# Patient Record
Sex: FEMALE | Employment: UNEMPLOYED | URBAN - METROPOLITAN AREA
[De-identification: names, ages, dates, MRNs, and addresses within clinical notes are randomized per-mention and may not be internally consistent; named-entity substitution may affect disease eponyms.]

---

## 2017-01-25 ENCOUNTER — TELEPHONE (OUTPATIENT)
Dept: OBGYN | Facility: CLINIC | Age: 52
End: 2017-01-25

## 2017-01-25 NOTE — TELEPHONE ENCOUNTER
----- Message from Gail Yates sent at 1/25/2017  1:39 PM CST -----  Regarding: discuss breast pain  Contact: [phone]  Pt experiencing breast pain and would like a call back from Dr Zapien at number listed above.    Thanks    Gail PUENTE    Please DO NOT send this message and/or reply back to sender.  Call Center Representatives DO NOT respond to messages.

## 2017-01-25 NOTE — TELEPHONE ENCOUNTER
Spoke with Isha who stated that she has had breast pain since her last mammogram - reported to be March 2016 at [facility] (reports results normal). The pain is getting increasingly worse and is burning in nature. When asked about lump she reports she has dense breasts. Denies nipple leaking, dimpling, redness.    Advised she come in to clinic for evaluation and she agreed with plan. Was scheduled with nurse practitioner.

## 2017-01-27 ENCOUNTER — OFFICE VISIT (OUTPATIENT)
Dept: OBGYN | Facility: CLINIC | Age: 52
End: 2017-01-27
Attending: NURSE PRACTITIONER
Payer: COMMERCIAL

## 2017-01-27 DIAGNOSIS — N64.4 MASTODYNIA: ICD-10-CM

## 2017-01-27 DIAGNOSIS — N63.0 LUMP OR MASS IN BREAST: Primary | ICD-10-CM

## 2017-01-27 PROCEDURE — 99212 OFFICE O/P EST SF 10 MIN: CPT | Mod: ZF

## 2017-01-27 NOTE — NURSING NOTE
Chief Complaint   Patient presents with     RECHECK     C/O burning pain in left breast   Blanca Brito LPN

## 2017-01-27 NOTE — PROGRESS NOTES
Progress Note    SUBJECTIVE:  Isha Vergara is an 51 year old , who requests evaluation of breast pain.    Concerns today include:   1) Mastodynia: Isha has noted breast pain that started right after her last mammogram, in 2016.  She describes the pain as achey and that her breast is very tender to the touch.  Pain has slightly improved since first noted in March.  Denies any change in skin, dimpling, or nipple discharge.  She has a known small, mobile, cyst in the left breast at 6 o'clock position that has been evaluated by ultrasound ~7 yrs ago.  She states that it has not been evident on the annual mammograms she has had and has not changed over time, but is tender to palpation.  Pain did not change with menstrual cycles (see menstrual hx below).      2) Bilateral breast burning: Over the last several months, Isha has noted breast burning.  It started in the left breast, but now she experiences it in both breast.  The burning seems to start in the middle of her chest and radiate into each breast.  She feels this intermittently, but at least once per day.  Has found it uncomfortable to wear a bra; improvement was felt when not wearing a bra.  She works out 5-6 times per week.  Does not drink caffeine or drink alcohol due to her allergy to sulfites.  Denies chest pain, heaviness, or pressure.    Isha had a left breast cyst removed at age 17. She denies any family hx of breast, colon, or ovarian cancers.      Last mammogram: 2016 at [facility]; results were normal    Menstrual History:  - Last gynecologic visit note indicated that Isha has gone through menopause.  Upon assessing her menopausal state, Isha states that she has never gone 12 months without a menstrual period.  Isha states with confidence that she had one instance of going 8 months without a period, however, her menses returned July to 2016, with menses once per month, with normal flow for her.  She denies any vaginal  bleeding since 2016.    - She experiences ocassional mild hot flashes    HISTORY:  Prescription Medications as of 2017             Ascorbic Acid (VITAMIN C PO) Take 250 mg by mouth    Omega-3 Fatty Acids (OMEGA-3 FISH OIL PO) Take 1 g by mouth    calcium carbonate (OS-KRISTEL 500 MG [place]. CA) 500 MG tablet Take 500 mg by mouth 2 times daily    multivitamin, therapeutic with minerals (MULTI-VITAMIN) TABS Take 1 tablet by mouth daily        Allergies   Allergen Reactions     Sulfites Hives     Immunization History   Administered Date(s) Administered     Influenza Vaccine, 3 YRS +, IM (QUADRIVALENT W/PRESERVATIVES) 10/21/2016     Obstetric History       T2      TAB0   SAB0   E0   M0   L2      Past Medical History   Diagnosis Date     Acne      Eczema      Past Surgical History   Procedure Laterality Date      section       Excise breast cyst/fibroadenoma/tumor/duct lesion/nipple lesion/areolar lesion  age 17     Excise cyst thyroglossal duct       Family History   Problem Relation Age of Onset     Allergies Mother      Hypertension Father      C.A.D. Father      Thyroid Disease Mother      CEREBROVASCULAR DISEASE Maternal Grandmother      Social History     Social History     Marital Status:      Spouse Name: N/A     Number of Children: N/A     Years of Education: N/A     Social History Main Topics     Smoking status: Never Smoker      Smokeless tobacco: Never Used     Alcohol Use: No     Drug Use: No     Sexual Activity:     Partners: Male     Birth Control/ Protection: Other      Comment: Vasectomy     ROS   ROS: 10 point ROS neg other than the symptoms noted above in the HPI.    EXAM:  VS: 149/84; P: 108; Wt 130lb  General appearance: Pleasant female in no acute distress.     BREAST EXAM:  Breast: Without visible skin changes. No dimpling or lesions seen.   Breasts supple; no nipple discharge noted bilaterally; axillary nodes negative  Left breast: small 4mm-5mm round mobile  mass, tender to palpation; left breast diffusely tender to palpation  Right breast: without tenderness to palpation, nodularity, or a dominant mass    ASSESSMENT:  Encounter Diagnoses   Name Primary?     Lump or mass in breast Yes     Mastodynia      PLAN:   Orders Placed This Encounter   Procedures     US Breast Left Limited 1-3 Quadrants     BREAST CENTER REFERRAL   Bilateral Diagnostic Mammogram ordered.  Provided Isha with a patient handout on breast pain and discussed possible lifestyle modifications she could try to relieve her symptoms.  Encouraged Isha to keep a log of when she feels the pain and her diet and exercise to see if there are any identifiable triggers.    Isha is due for an annual exam 4/2017.  Isha left the clinic before the medical assistant was able to complete her follow-up vital signs.  Isha to follow-up on elevated blood pressure & pulse rate at her annual exam.  Verbalized understanding and agreement with visit plan.    20 minutes was spent in direct contact with the patient and > 50% of the time in patient education and coordination of care.  Chula Arriola, TIFFANIE, APRN, WHNP

## 2017-01-27 NOTE — MR AVS SNAPSHOT
After Visit Summary   1/27/2017    Isha Vergara    MRN: [id number]           Patient Information     Date Of Birth          1965        Visit Information        Provider Department      1/27/2017 12:45 PM Chula Arriola APRN CNP [facility]        Today's Diagnoses     Lump or mass in breast    -  1     Mastodynia            Follow-ups after your visit        Additional Services     BREAST CENTER REFERRAL       Your provider has referred you to: [facility]: Breast Center at [facility] [phone]   [email or web address]    Please be aware that coverage of these services is subject to the terms and limitations of your health insurance plan.  Call member services at your health plan with any benefit or coverage questions.      Please bring the following with you to your appointment:    (1) Any X-Rays, CTs or MRIs which have been performed.  Contact the facility where they were done to arrange for  prior to your scheduled appointment.    (2) List of current medications   (3) This referral request   (4) Any documents/labs given to you for this referral                  Future tests that were ordered for you today     Open Future Orders        Priority Expected Expires Ordered    Mammo diagnostic  digital (bilateral) Routine  1/27/2018 1/27/2017    US Breast Left Limited 1-3 Quadrants Routine  1/27/2018 1/27/2017            Who to contact     Please call your clinic at [phone] to:    Ask questions about your health    Make or cancel appointments    Discuss your medicines    Learn about your test results    Speak to your doctor   If you have compliments or concerns about an experience at your clinic, or if you wish to file a complaint, please contact [facility] Patient Relations at [phone] or email us at [email or web address]         Additional Information  About Your Visit        User Replay[employer] innovations Information     [employer] gives you secure access to your electronic health record. If you see a primary care provider, you can also send messages to your care team and make appointments. If you have questions, please call your primary care clinic.  If you do not have a primary care provider, please call [phone] and they will assist you.      [employer] is an electronic gateway that provides easy, online access to your medical records. With [employer], you can request a clinic appointment, read your test results, renew a prescription or communicate with your care team.     To access your existing account, please contact your [facility] or call [phone] for assistance.        Care EveryWhere ID     This is your Care EveryWhere ID. This could be used by other organizations to access your [facility] medical records  [id number]        Your Vitals Were     Last Period                   12/28/2014            Blood Pressure from Last 3 Encounters:   04/07/16 132/89   01/15/15 135/84   09/16/13 119/77    Weight from Last 3 Encounters:   04/07/16 58.968 kg (130 lb)   01/15/15 58.469 kg (128 lb 14.4 oz)   09/16/13 59.829 kg (131 lb 14.4 oz)              We Performed the Following     BREAST CENTER REFERRAL          Today's Medication Changes          These changes are accurate as of: 1/27/17  1:47 PM.  If you have any questions, ask your nurse or doctor.               Stop taking these medicines if you haven't already. Please contact your care team if you have questions.     ALLEGRA PO   Stopped by:  Chula Arriola APRN CNP           pimecrolimus 1 % cream   Commonly known as:  ELIDEL   Stopped by:  Chula Arriola APRN CNP           VITAMIN D (CHOLECALCIFEROL) PO   Stopped by:  Chula Arriola APRN CNP                    Primary Care Provider    None Specified       No primary provider on file.        Thank you!     Thank you for choosing  [facility]  for your care. Our goal is always to provide you with excellent care. Hearing back from our patients is one way we can continue to improve our services. Please take a few minutes to complete the written survey that you may receive in the mail after your visit with us. Thank you!             Your Updated Medication List - Protect others around you: Learn how to safely use, store and throw away your medicines at www.disposemymeds.org.          This list is accurate as of: 1/27/17  1:47 PM.  Always use your most recent med list.                   Brand Name Dispense Instructions for use    calcium carbonate 500 MG tablet    OS-KRISTEL 500 mg [place]. Ca     Take 500 mg by mouth 2 times daily       Multi-vitamin Tabs tablet      Take 1 tablet by mouth daily       OMEGA-3 FISH OIL PO      Take 1 g by mouth       VITAMIN C PO      Take 250 mg by mouth

## 2017-01-27 NOTE — Clinical Note
2017       RE: Isha Vergara  [address] [id number]     Dear Colleague,    Thank you for referring your patient, Isha Vergara, to the [facility] at [facility]. Please see a copy of my visit note below.    Progress Note    SUBJECTIVE:  Isha Vergara is an 51 year old , who requests evaluation of breast pain.    Concerns today include:   1) Mastodynia: Isha has noted breast pain that started right after her last mammogram, in 2016.  She describes the pain as achey and that her breast is very tender to the touch.  Pain has slightly improved since first noted in March.  Denies any change in skin, dimpling, or nipple discharge.  She has a known small, mobile, cyst in the left breast at 6 o'clock position that has been evaluated by ultrasound ~7 yrs ago.  She states that it has not been evident on the annual mammograms she has had and has not changed over time, but is tender to palpation.  Pain did not change with menstrual cycles (see menstrual hx below).      2) Bilateral breast burning: Over the last several months, Isha has noted breast burning.  It started in the left breast, but now she experiences it in both breast.  The burning seems to start in the middle of her chest and radiate into each breast.  She feels this intermittently, but at least once per day.  Has found it uncomfortable to wear a bra; improvement was felt when not wearing a bra.  She works out 5-6 times per week.  Does not drink caffeine or drink alcohol due to her allergy to sulfites.  Denies chest pain, heaviness, or pressure.    Isha had a left breast cyst removed at age 17. She denies any family hx of breast, colon, or ovarian cancers.      Last mammogram: 2016 at [facility]; results were normal    Menstrual History:  - Last gynecologic visit note indicated that Isha has gone through menopause.  Upon assessing her menopausal state, Isha states  that she has never gone 12 months without a menstrual period.  [facility] states with confidence that she had one instance of going 8 months without a period, however, her menses returned July to 2016, with menses once per month, with normal flow for her.  She denies any vaginal bleeding since 2016.    - She experiences ocassional mild hot flashes    HISTORY:  Prescription Medications as of 2017             Ascorbic Acid (VITAMIN C PO) Take 250 mg by mouth    Omega-3 Fatty Acids (OMEGA-3 FISH OIL PO) Take 1 g by mouth    calcium carbonate (OS-KRISTEL 500 MG [place]. CA) 500 MG tablet Take 500 mg by mouth 2 times daily    multivitamin, therapeutic with minerals (MULTI-VITAMIN) TABS Take 1 tablet by mouth daily        Allergies   Allergen Reactions     Sulfites Hives     Immunization History   Administered Date(s) Administered     Influenza Vaccine, 3 YRS +, IM (QUADRIVALENT W/PRESERVATIVES) 10/21/2016     Obstetric History       T2      TAB0   SAB0   E0   M0   L2      Past Medical History   Diagnosis Date     Acne      Eczema      Past Surgical History   Procedure Laterality Date      section       Excise breast cyst/fibroadenoma/tumor/duct lesion/nipple lesion/areolar lesion  age 17     Excise cyst thyroglossal duct       Family History   Problem Relation Age of Onset     Allergies Mother      Hypertension Father      C.A.D. Father      Thyroid Disease Mother      CEREBROVASCULAR DISEASE Maternal Grandmother      Social History     Social History     Marital Status:      Spouse Name: N/A     Number of Children: N/A     Years of Education: N/A     Social History Main Topics     Smoking status: Never Smoker      Smokeless tobacco: Never Used     Alcohol Use: No     Drug Use: No     Sexual Activity:     Partners: Male     Birth Control/ Protection: Other      Comment: Vasectomy     ROS   ROS: 10 point ROS neg other than the symptoms noted above in the HPI.    EXAM:  VS: 149/84; P:  108; Wt 130lb  General appearance: Pleasant female in no acute distress.     BREAST EXAM:  Breast: Without visible skin changes. No dimpling or lesions seen.   Breasts supple; no nipple discharge noted bilaterally; axillary nodes negative  Left breast: small 4mm-5mm round mobile mass, tender to palpation; left breast diffusely tender to palpation  Right breast: without tenderness to palpation, nodularity, or a dominant mass    ASSESSMENT:  Encounter Diagnoses   Name Primary?     Lump or mass in breast Yes     Mastodynia      PLAN:   Orders Placed This Encounter   Procedures     US Breast Left Limited 1-3 Quadrants     BREAST CENTER REFERRAL   Bilateral Diagnostic Mammogram ordered.  Provided Isha with a patient handout on breast pain and discussed possible lifestyle modifications she could try to relieve her symptoms.  Encouraged Isha to keep a log of when she feels the pain and her diet and exercise to see if there are any identifiable triggers.    Isha is due for an annual exam 4/2017.  Isha left the clinic before the medical assistant was able to complete her follow-up vital signs.  Isha to follow-up on elevated blood pressure & pulse rate at her annual exam.  Verbalized understanding and agreement with visit plan.    20 minutes was spent in direct contact with the patient and > 50% of the time in patient education and coordination of care.      Chula Arriola, TIFFANIE, APRN, WHNP

## 2017-02-01 ENCOUNTER — COMMUNICATION - HEALTHEAST (OUTPATIENT)
Dept: SCHEDULING | Facility: CLINIC | Age: 52
End: 2017-02-01

## 2017-02-01 ENCOUNTER — OFFICE VISIT - HEALTHEAST (OUTPATIENT)
Dept: FAMILY MEDICINE | Facility: CLINIC | Age: 52
End: 2017-02-01

## 2017-02-01 DIAGNOSIS — D72.819 LEUKOPENIA: ICD-10-CM

## 2017-02-01 DIAGNOSIS — Z13.21 SCREENING FOR ENDOCRINE, NUTRITIONAL, METABOLIC AND IMMUNITY DISORDER: ICD-10-CM

## 2017-02-01 DIAGNOSIS — I73.00 RAYNAUD'S DISEASE WITHOUT GANGRENE: ICD-10-CM

## 2017-02-01 DIAGNOSIS — Z13.0 SCREENING FOR ENDOCRINE, NUTRITIONAL, METABOLIC AND IMMUNITY DISORDER: ICD-10-CM

## 2017-02-01 DIAGNOSIS — Z13.29 SCREENING FOR ENDOCRINE, NUTRITIONAL, METABOLIC AND IMMUNITY DISORDER: ICD-10-CM

## 2017-02-01 DIAGNOSIS — F41.9 ANXIETY: ICD-10-CM

## 2017-02-01 DIAGNOSIS — Z13.228 SCREENING FOR ENDOCRINE, NUTRITIONAL, METABOLIC AND IMMUNITY DISORDER: ICD-10-CM

## 2017-02-01 DIAGNOSIS — Z00.00 ROUTINE GENERAL MEDICAL EXAMINATION AT A HEALTH CARE FACILITY: ICD-10-CM

## 2017-02-01 DIAGNOSIS — Z00.00 PREVENTATIVE HEALTH CARE: ICD-10-CM

## 2017-02-01 DIAGNOSIS — Z13.220 SCREENING, LIPID: ICD-10-CM

## 2017-02-01 DIAGNOSIS — Z13.228 SCREENING FOR METABOLIC DISORDER: ICD-10-CM

## 2017-02-01 DIAGNOSIS — Z13.1 SCREENING FOR DIABETES MELLITUS: ICD-10-CM

## 2017-02-01 DIAGNOSIS — Z13.0 SCREENING, ANEMIA, DEFICIENCY, IRON: ICD-10-CM

## 2017-02-01 DIAGNOSIS — K21.9 GASTROESOPHAGEAL REFLUX DISEASE WITHOUT ESOPHAGITIS: ICD-10-CM

## 2017-02-01 DIAGNOSIS — Z13.29 SCREENING FOR THYROID DISORDER: ICD-10-CM

## 2017-02-01 LAB
CHOLEST SERPL-MCNC: 202 MG/DL
FASTING STATUS PATIENT QL REPORTED: YES
HBA1C MFR BLD: 5 % (ref 3.5–6)
HDLC SERPL-MCNC: 63 MG/DL
LDLC SERPL CALC-MCNC: 131 MG/DL
TRIGL SERPL-MCNC: 40 MG/DL

## 2017-02-01 ASSESSMENT — MIFFLIN-ST. JEOR: SCORE: 1213.23

## 2017-02-01 NOTE — ASSESSMENT & PLAN NOTE
She describes being very worried about her health because of sick family members.  For example she has noticed that her hands sometimes feel cold so she thinks she could have lupus.  She also says that she is not in favor of taking medications.    For her anxiety today I did recommend that she consider someday taking an SSRI.  She is not ready for that at this times so I suggested counseling and/or meditation.    She will try some daily meditation and return to clinic as needed.

## 2017-02-01 NOTE — ASSESSMENT & PLAN NOTE
Pap: sees a gynecologist for this.  Mammo: sees a gynecologist for this.  Colonoscopy:  recommended in March 2017 - she says she will get this through her gynecologist.  Std testing desired:  offered  Osteoporosis prevention discussed.  vitamin d levels ordered. Recommend daily calcium and vitamin d intake to keep good bone health. Recommend weight bearing exercise, no tobacco, and limit alcohol  dexa - recommended after menopause starts  Recommend sunscreen, exercise, & healthy diet.  Offered tsh, glucose, hgb, lipid  I have had an Advance Directives discussion with the patient.   Body mass index is 20.05 kg/(m^2).   mychart offered.

## 2017-02-02 ENCOUNTER — AMBULATORY - HEALTHEAST (OUTPATIENT)
Dept: FAMILY MEDICINE | Facility: CLINIC | Age: 52
End: 2017-02-02

## 2017-02-02 ENCOUNTER — COMMUNICATION - HEALTHEAST (OUTPATIENT)
Dept: FAMILY MEDICINE | Facility: CLINIC | Age: 52
End: 2017-02-02

## 2017-02-02 DIAGNOSIS — E78.5 HYPERLIPIDEMIA, UNSPECIFIED HYPERLIPIDEMIA TYPE: ICD-10-CM

## 2017-02-02 DIAGNOSIS — D72.819 LEUKOPENIA: ICD-10-CM

## 2017-02-02 NOTE — ASSESSMENT & PLAN NOTE
ASCVD calculation done.Not in statin benefit group. Recommend lifestyle modifications - heart healthy diet, regular aerobic exercise, maintain normal body weight, avoid tobacco products.

## 2017-02-03 LAB
BASOPHILS # BLD AUTO: 0 THOU/UL (ref 0–0.2)
BASOPHILS NFR BLD AUTO: 1 % (ref 0–2)
EOSINOPHIL # BLD AUTO: 0 THOU/UL (ref 0–0.4)
EOSINOPHIL NFR BLD AUTO: 1 % (ref 0–6)
ERYTHROCYTE [DISTWIDTH] IN BLOOD BY AUTOMATED COUNT: 13.1 % (ref 11–14.5)
HCT VFR BLD AUTO: 42.4 % (ref 35–47)
HGB BLD-MCNC: 13.1 G/DL (ref 12–16)
LAB AP CHARGES (HE HISTORICAL CONVERSION): NORMAL
LYMPHOCYTES # BLD AUTO: 1.2 THOU/UL (ref 0.8–4.4)
LYMPHOCYTES NFR BLD AUTO: 35 % (ref 20–40)
MCH RBC QN AUTO: 27 PG (ref 27–34)
MCHC RBC AUTO-ENTMCNC: 30.9 G/DL (ref 32–36)
MCV RBC AUTO: 87 FL (ref 80–100)
MONOCYTES # BLD AUTO: 0.4 THOU/UL (ref 0–0.9)
MONOCYTES NFR BLD AUTO: 11 % (ref 2–10)
NEUTROPHILS # BLD AUTO: 1.7 THOU/UL (ref 2–7.7)
NEUTROPHILS NFR BLD AUTO: 53 % (ref 50–70)
PATH REPORT.COMMENTS IMP SPEC: NORMAL
PATH REPORT.COMMENTS IMP SPEC: NORMAL
PATH REPORT.FINAL DX SPEC: NORMAL
PATH REPORT.MICROSCOPIC SPEC OTHER STN: ABNORMAL
PATH REPORT.MICROSCOPIC SPEC OTHER STN: NORMAL
PATH REPORT.RELEVANT HX SPEC: NORMAL
PLATELET # BLD AUTO: 255 THOU/UL (ref 140–440)
PMV BLD AUTO: 10.4 FL (ref 8.5–12.5)
RBC # BLD AUTO: 4.85 MILL/UL (ref 3.8–5.4)
WBC: 3.3 THOU/UL (ref 4–11)

## 2017-02-06 ENCOUNTER — RADIANT APPOINTMENT (OUTPATIENT)
Dept: MAMMOGRAPHY | Facility: CLINIC | Age: 52
End: 2017-02-06
Attending: NURSE PRACTITIONER

## 2017-02-06 DIAGNOSIS — N64.4 MASTODYNIA: ICD-10-CM

## 2017-02-08 ASSESSMENT — ENCOUNTER SYMPTOMS
LEG SWELLING: 0
TASTE DISTURBANCE: 0
SEIZURES: 0
VOMITING: 0
BLOOD IN STOOL: 0
HOARSE VOICE: 0
TACHYCARDIA: 1
BREAST PAIN: 1
HYPOTENSION: 1
NAUSEA: 0
BRUISES/BLEEDS EASILY: 0
DEPRESSION: 0
MYALGIAS: 0
STIFFNESS: 0
BOWEL INCONTINENCE: 0
LOSS OF CONSCIOUSNESS: 0
EXERCISE INTOLERANCE: 0
INCREASED ENERGY: 1
HALLUCINATIONS: 0
DECREASED CONCENTRATION: 1
ALTERED TEMPERATURE REGULATION: 1
FEVER: 0
NERVOUS/ANXIOUS: 1
MUSCLE CRAMPS: 0
CLAUDICATION: 0
POLYPHAGIA: 0
SYNCOPE: 0
TROUBLE SWALLOWING: 0
SPEECH CHANGE: 0
JOINT SWELLING: 0
BACK PAIN: 0
PANIC: 0
BLOATING: 0
DIARRHEA: 0
CONSTIPATION: 0
NIGHT SWEATS: 0
SWOLLEN GLANDS: 0
LEG PAIN: 0
INSOMNIA: 1
RECTAL PAIN: 0
ABDOMINAL PAIN: 1
SINUS PAIN: 1
SORE THROAT: 0
PALPITATIONS: 1
WEIGHT GAIN: 0
DISTURBANCES IN COORDINATION: 0
TREMORS: 0
DECREASED LIBIDO: 0
FATIGUE: 1
LIGHT-HEADEDNESS: 1
HEADACHES: 0
HYPERTENSION: 0
WEIGHT LOSS: 0
SMELL DISTURBANCE: 0
ORTHOPNEA: 0
NECK PAIN: 0
NECK MASS: 0
SLEEP DISTURBANCES DUE TO BREATHING: 0
BREAST MASS: 1
JAUNDICE: 0
MUSCLE WEAKNESS: 0
ARTHRALGIAS: 1
TINGLING: 1
CHILLS: 1
WEAKNESS: 0
HOT FLASHES: 0
POLYDIPSIA: 0
NUMBNESS: 1
DECREASED APPETITE: 0
RECTAL BLEEDING: 0
SINUS CONGESTION: 0
HEARTBURN: 1
MEMORY LOSS: 0
PARALYSIS: 0
DIZZINESS: 1

## 2017-02-22 ENCOUNTER — ONCOLOGY VISIT (OUTPATIENT)
Dept: ONCOLOGY | Facility: CLINIC | Age: 52
End: 2017-02-22
Attending: FAMILY MEDICINE
Payer: COMMERCIAL

## 2017-02-22 VITALS
TEMPERATURE: 96.5 F | RESPIRATION RATE: 16 BRPM | HEART RATE: 84 BPM | DIASTOLIC BLOOD PRESSURE: 81 MMHG | SYSTOLIC BLOOD PRESSURE: 144 MMHG | OXYGEN SATURATION: 100 % | HEIGHT: 67 IN | WEIGHT: 130.6 LBS | BODY MASS INDEX: 20.5 KG/M2

## 2017-02-22 DIAGNOSIS — N64.4 BREAST PAIN: Primary | ICD-10-CM

## 2017-02-22 ASSESSMENT — PAIN SCALES - GENERAL: PAINLEVEL: NO PAIN (0)

## 2017-02-22 NOTE — NURSING NOTE
"Isha Vergara is a 51 year old female who presents for:  Chief Complaint   Patient presents with     Oncology Clinic Visit     mastodynia, lump or mass in breast        Initial Vitals:  /81  Pulse 84  Temp 96.5  F (35.8  C) (Oral)  Resp 16  Ht 1.699 m (5' 6.89\")  Wt 59.2 kg (130 lb 9.6 oz)  LMP 12/28/2014  SpO2 100%  BMI 20.52 kg/m2 Estimated body mass index is 20.52 kg/(m^2) as calculated from the following:    Height as of this encounter: 1.699 m (5' 6.89\").    Weight as of this encounter: 59.2 kg (130 lb 9.6 oz).. Body surface area is 1.67 meters squared. BP completed using cuff size: regular  No Pain (0) Patient's last menstrual period was 12/28/2014. Allergies and medications reviewed.     Medications: Medication refills not needed today.  Pharmacy name entered into [facility]: [id number] - [place], [place] - [address]    Comments: Patient is not having any pain today.     6 minutes for nursing intake (face to face time)   Cathie Andrade CMA       "

## 2017-02-22 NOTE — PROGRESS NOTES
Isha is a 51 year old female who presents to the Breast Center with Left sided breast pain   - Hx of breast cysts [sebaceous].    - 17 years old had fibroadenoma removed  - Some tender breasts that she thinks is secondary to shoulder pain and heart burn. Now is mostly resolved.  But admits to much anxiety about breast health.  - Heartburn triggered by antibiotics [end of January]. Took nexium and changed diet and heartburn is better and breast pain is better.  Some shoulder pain that may be contributing.  -  2/6/2017: mammogram and US, left - negative    BREAST DENSITY: Heterogeneously dense     FINDINGS:   Bilateral mammogram: No suspicious findings in the right breast. There is a possible focal asymmetry in the upper outer quadrant of the left breast, likely overlapping breast tissue.     Breast ultrasound: Scanning was performed by the technologist and the radiologist. No suspicious findings in the area of possible focal asymmetry upper outer quadrant of the left breast, which is also in the area of pain concern.     There is a circumscribed anechoic mass with posterior enhancement in the left breast 6:00 position area of palpable concern measuring 0.7 cm, within the skin, consistent with sebaceous cyst.       IMPRESSION: BI-RADS CATEGORY: 2 - Benign Finding(s)  HPI   Breast Cancer Risk Profile: Age: 51 year old, Gravity 2, Parity 2.  Age at first birth 32. Menarche 16. LMP: perimenopausal. Previous Breast Biopsy # 1, Results fibroadenoma age 17. .  Personal History of Cancer No.  Family History; Breast cancer negative; ovarian cancer negative; Colon Cancer negative.    ROS  General:  None  Head/eyes:  None  Ears/Nose/Throat:  None  Breast:  pain  Cardiovascular:  None  Respiratory:  None  Gastrointestinal:  None  Genitourinary:  None  Sexual Function:  None  Musculoskeletal:  None  Skin:  None  Neurological:  None  Mental Health:  None  Endocrine:  None  Past Medical History:  Past Medical History   Diagnosis  "Date     Acne      Eczema      Past Surgical History:  Past Surgical History   Procedure Laterality Date      section       Excise breast cyst/fibroadenoma/tumor/duct lesion/nipple lesion/areolar lesion  age 17     Excise cyst thyroglossal duct       Medications:  Current Outpatient Prescriptions   Medication Sig Dispense Refill     Omega-3 Fatty Acids (OMEGA-3 FISH OIL PO) Take 1 g by mouth       calcium carbonate (OS-KRISTEL 500 MG [place]. CA) 500 MG tablet Take 500 mg by mouth 2 times daily       multivitamin, therapeutic with minerals (MULTI-VITAMIN) TABS Take 1 tablet by mouth daily       Family Hx:   Mother  in 60's with mixed authoimmune disease/vasculitis  Family History   Problem Relation Age of Onset     Allergies Mother      Thyroid Disease Mother      Hypertension Father      C.A.D. Father      CEREBROVASCULAR DISEASE Maternal Grandmother    Presents with her .   Vitals:   /81  Pulse 84  Temp 96.5  F (35.8  C) (Oral)  Resp 16  Ht 1.699 m (5' 6.89\")  Wt 59.2 kg (130 lb 9.6 oz)  LMP 2014  SpO2 100%  BMI 20.52 kg/m2  Physical Exam:  Constitutional: no distress  Pyschological: Alert/Oriented  Eyes: anicteric, normal extra-ocular movements  Neck: No thyroidmegaly  Breast: Examined in a sitting and lying position.  Symmetrical without visible distortion, swelling or rashes.  No nipple inversion, nipple discharge, breast dimpling or puckering.  Breast tissue is heterogenous.  Dense to palpation.  No abnormal masses noted. Axillary area without masses or lympadenopathy.  Lymphatic:  No lymphadenopathy  Skin: no concerning lesions, no jaundice  Neurological: Normal gait, no tremor  25 minutes was spent in direct contact with the patient and > 50% of the time in patient education and coordination of care.  RESULTS:    Mammogram/US 2017: BREAST DENSITY: Heterogeneously dense. FINDINGS: Bilateral mammogram: No suspicious findings in the right breast. There is a possible focal asymmetry " in the upper outer quadrant of the left breast, likely overlapping breast tissue.   Breast ultrasound: Scanning was performed by the technologist and the radiologist. No suspicious findings in the area of possible focal asymmetry upper outer quadrant of the left breast, which is also in the area of pain concern.     There is a circumscribed anechoic mass with posterior enhancement in the left breast 6:00 position area of palpable concern measuring 0.7 cm, within the skin, consistent with sebaceous cyst.    IMPRESSION: BI-RADS CATEGORY: 2 - Benign Finding(s)  Diagnoses and associated orders for this visit:  Breast pain, left  1) Reviewed images [from 2.6.2017]  with radiologist and no concerning findings.    2) Reassurance to patient that images and Breast Exam are normal.  Discussed unclear etiology for breast pain and its unlikely association with Breast cancer.   3) Continue with yearly screening mammogram and Clinical breast exam according to the ACS guidelines:    4) She will consider removal of sebaceous cyst.  Advised that this be done with dermatology.

## 2017-03-06 ENCOUNTER — AMBULATORY - HEALTHEAST (OUTPATIENT)
Dept: LAB | Facility: CLINIC | Age: 52
End: 2017-03-06

## 2017-03-06 DIAGNOSIS — D72.819 LEUKOPENIA: ICD-10-CM

## 2017-04-07 ASSESSMENT — ENCOUNTER SYMPTOMS
BLOOD IN STOOL: 0
DYSPNEA ON EXERTION: 0
HOARSE VOICE: 0
SHORTNESS OF BREATH: 0
SPUTUM PRODUCTION: 1
NAIL CHANGES: 0
INSOMNIA: 1
PANIC: 0
NERVOUS/ANXIOUS: 1
RECTAL PAIN: 0
COUGH DISTURBING SLEEP: 1
SKIN CHANGES: 0
BLOATING: 0
SINUS PAIN: 1
NECK MASS: 0
SORE THROAT: 0
SMELL DISTURBANCE: 0
HEARTBURN: 1
RESPIRATORY PAIN: 0
TASTE DISTURBANCE: 0
VOMITING: 0
DEPRESSION: 0
CONSTIPATION: 0
BOWEL INCONTINENCE: 0
COUGH: 1
POOR WOUND HEALING: 0
DECREASED CONCENTRATION: 0
ABDOMINAL PAIN: 0
HEMOPTYSIS: 0
POSTURAL DYSPNEA: 0
WHEEZING: 0
SINUS CONGESTION: 1
JAUNDICE: 0
DIARRHEA: 0
TROUBLE SWALLOWING: 0
RECTAL BLEEDING: 0
NAUSEA: 1
SNORES LOUDLY: 0

## 2017-04-07 ASSESSMENT — ANXIETY QUESTIONNAIRES
GAD7 TOTAL SCORE: 7
GAD7 TOTAL SCORE: 7
7. FEELING AFRAID AS IF SOMETHING AWFUL MIGHT HAPPEN: 1 = SEVERAL DAYS

## 2017-04-08 ASSESSMENT — ANXIETY QUESTIONNAIRES: GAD7 TOTAL SCORE: 7

## 2017-04-10 ENCOUNTER — OFFICE VISIT (OUTPATIENT)
Dept: OBGYN | Facility: CLINIC | Age: 52
End: 2017-04-10
Attending: OBSTETRICS & GYNECOLOGY
Payer: COMMERCIAL

## 2017-04-10 VITALS
HEIGHT: 67 IN | DIASTOLIC BLOOD PRESSURE: 77 MMHG | WEIGHT: 133.3 LBS | HEART RATE: 80 BPM | BODY MASS INDEX: 20.92 KG/M2 | SYSTOLIC BLOOD PRESSURE: 124 MMHG

## 2017-04-10 DIAGNOSIS — Z01.419 ENCOUNTER FOR GYNECOLOGICAL EXAMINATION WITHOUT ABNORMAL FINDING: Primary | ICD-10-CM

## 2017-04-10 DIAGNOSIS — L70.9 ACNE, UNSPECIFIED ACNE TYPE: ICD-10-CM

## 2017-04-10 DIAGNOSIS — L85.3 DRY SKIN: ICD-10-CM

## 2017-04-10 DIAGNOSIS — R21 RASH AND NONSPECIFIC SKIN ERUPTION: ICD-10-CM

## 2017-04-10 DIAGNOSIS — T78.40XA ALLERGIC STATE, INITIAL ENCOUNTER: ICD-10-CM

## 2017-04-10 PROCEDURE — 99212 OFFICE O/P EST SF 10 MIN: CPT | Mod: ZF

## 2017-04-10 RX ORDER — TRETINOIN 0.5 MG/G
CREAM TOPICAL
COMMUNITY
Start: 2016-10-01 | End: 2017-04-10

## 2017-04-10 RX ORDER — TRETINOIN 0.5 MG/G
CREAM TOPICAL AT BEDTIME
Qty: 45 G | Refills: 3 | Status: SHIPPED | OUTPATIENT
Start: 2017-04-10 | End: 2019-01-03

## 2017-04-10 NOTE — MR AVS SNAPSHOT
After Visit Summary   4/10/2017    Isha Vergara    MRN: [id number]           Patient Information     Date Of Birth          1965        Visit Information        Provider Department      4/10/2017 10:00 AM Sandra Zapien MD [facility]        Today's Diagnoses     Encounter for gynecological examination without abnormal finding    -  1    Acne, unspecified acne type        Dry skin        Rash and nonspecific skin eruption        Allergic state, initial encounter           Follow-ups after your visit        Additional Services     ALLERGY/ASTHMA ADULT REFERRAL       Your provider has referred you to: [facility]: [facility] [phone]   [email or web address]    Please be aware that coverage of these services is subject to the terms and limitations of your health insurance plan.  Call member services at your health plan with any benefit or coverage questions.      Please bring the following with you to your appointment:    (1) Any X-Rays, CTs or MRIs which have been performed.  Contact the facility where they were done to arrange for  prior to your scheduled appointment.    (2) List of current medications  (3) This referral request   (4) Any documents/labs given to you for this referral            DERMATOLOGY REFERRAL       Your provider has referred you to: [facility]: [facility] [place] [phone]   [email or web address]    Please be aware that coverage of these services is subject to the terms and limitations of your health insurance plan.  Call member services at your health plan with any benefit or coverage questions.      Please bring the following with you to your appointment:    (1) Any X-Rays, CTs or MRIs which have been performed.  Contact the facility where they were done to arrange for  prior to your scheduled appointment.    (2) List of current medications  (3) This  "referral request   (4) Any documents/labs given to you for this referral                  Who to contact     Please call your clinic at [phone] to:    Ask questions about your health    Make or cancel appointments    Discuss your medicines    Learn about your test results    Speak to your doctor   If you have compliments or concerns about an experience at your clinic, or if you wish to file a complaint, please contact [facility] at [phone] or email us at [email or web address]         Additional Information About Your Visit        [email or web address] Information     [employer] gives you secure access to your electronic health record. If you see a primary care provider, you can also send messages to your care team and make appointments. If you have questions, please call your primary care clinic.  If you do not have a primary care provider, please call [phone] and they will assist you.      [employer] is an electronic gateway that provides easy, online access to your medical records. With [employer], you can request a clinic appointment, read your test results, renew a prescription or communicate with your care team.     To access your existing account, please contact your [facility] or call [phone] for assistance.        Care EveryWhere ID     This is your Care EveryWhere ID. This could be used by other organizations to access your [facility] medical records  [id number]        Your Vitals Were     Pulse Height Last Period BMI (Body Mass Index)          80 1.699 m (5' 6.89\") 12/28/2014 20.95 kg/m2         Blood Pressure from Last 3 Encounters:   04/10/17 124/77   02/22/17 144/81   04/07/16 132/89    Weight from Last 3 Encounters:   04/10/17 60.5 kg (133 lb 4.8 oz)   02/22/17 59.2 kg (130 lb 9.6 oz)   04/07/16 59 kg (130 lb)              We Performed the Following     ALLERGY/ASTHMA ADULT REFERRAL     DERMATOLOGY REFERRAL        "   Today's Medication Changes          These changes are accurate as of: 4/10/17 10:44 AM.  If you have any questions, ask your nurse or doctor.               These medicines have changed or have updated prescriptions.        Dose/Directions    tretinoin 0.05 % cream   Commonly known as:  RETIN-A   This may have changed:    - how to take this  - when to take this   Used for:  Acne, unspecified acne type   Changed by:  Sandra Zapien MD        Apply topically At Bedtime   Quantity:  45 g   Refills:  3            Where to get your medicines      These medications were sent to [facility]/pharmacy #[id number] [place], [place] - [address]  [address] [address]     Phone:  [phone]     tretinoin 0.05 % cream                Primary Care Provider    None Specified       No primary provider on file.        Thank you!     Thank you for choosing [facility]  for your care. Our goal is always to provide you with excellent care. Hearing back from our patients is one way we can continue to improve our services. Please take a few minutes to complete the written survey that you may receive in the mail after your visit with us. Thank you!             Your Updated Medication List - Protect others around you: Learn how to safely use, store and throw away your medicines at www.disposemymeds.org.          This list is accurate as of: 4/10/17 10:44 AM.  Always use your most recent med list.                   Brand Name Dispense Instructions for use    B-12 (METHYLCOBALAMIN) SL          calcium carbonate 500 MG tablet    OS-KRISTEL 500 mg [place]. Ca     Take 500 mg by mouth 2 times daily       Multi-vitamin Tabs tablet      Take 1 tablet by mouth daily       OMEGA-3 FISH OIL PO      Take 1 g by mouth       tretinoin 0.05 % cream    RETIN-A    45 g    Apply topically At Bedtime

## 2017-04-10 NOTE — LETTER
Date:April 12, 2017      Patient was self referred, no letter generated. Do not send.        [facility] Health Information

## 2017-04-10 NOTE — LETTER
4/10/2017       RE: Isha Vergara  [address]  [place] [address]     Dear Colleague,    Thank you for referring your patient, Isha Vergara, to the [facility] at [facility]. Please see a copy of my visit note below.    Annual Well Woman Exam  4/10/17    Reason for visit: Annual exam    HPI: Patient is a 53 yo  who presents today for annual well woman exam.  Overall doing well but has had some issues in the last few months that have been causing her some anxiety.  She did have a breast lump on the left side which was evaluated and she was told it was benign and had a diagnostic mammogram and ultrasound without concerning findings.  She also suffered a terrible sinus infection which she had treated, but then she had a bad reaction to the antibiotics she was given and caused her further illness.  She also got Influenza despite receiving the vaccination.  She does get some ringing in her ears and would like these looked at today to make sure nothing is wrong.  She has been having anxiety secondary to all these issues, but this is assisted with meditation and family support.    She states she has been dealing with lots of itchy skin with occasional rashes, she is desiring to see a dermatologist and allergist regarding these issues.    From a GYN perspective, she has no other concerns.  She would like a refill of her Retin-A cream today as well.    Past OB/GYN History:  : 1 CS, 1   Menses: At her last annual, patient had gone almost 9 months without a period but then she started to have cycles again in the  of  and had normal monthly menses for 5 months, and then they stopped again for a few months.  She did have another 3 day bleeding cycle in March of this year.  She denies any other menopausal symptoms at this time.  Sexually active with , vasectomy for contraception  Denies dyspareunia or low sexual function  Pap smear  History: Last pap 2013 NILM, HPV negative, no history of abnormal pap, due again 2018  Does have history of breast cysts and mastodynia, [facility]  Had recent mammogram and US without concerning findings for cancer in 2017.    Past Medical History:   Diagnosis Date     Acne      Eczema      Past Surgical History:   Procedure Laterality Date     BREAST SURGERY      Fibroadenoma left breast. Age 17.      SECTION       EXCISE BREAST CYST/FIBROADENOMA/TUMOR/DUCT LESION/NIPPLE LESION/AREOLAR LESION  age 17     EXCISE CYST THYROGLOSSAL DUCT         Current Outpatient Prescriptions on File Prior to Visit:  Omega-3 Fatty Acids (OMEGA-3 FISH OIL PO) Take 1 g by mouth   calcium carbonate (OS-KRISTEL 500 MG [place]. [place]) 500 MG tablet Take 500 mg by mouth 2 times daily   multivitamin, therapeutic with minerals (MULTI-VITAMIN) TABS Take 1 tablet by mouth daily     No current facility-administered medications on file prior to visit.      Allergies   Allergen Reactions     Sulfites Hives     Social History   Substance Use Topics     Smoking status: Never Smoker     Smokeless tobacco: Never Used     Alcohol use No     Family History   Problem Relation Age of Onset     Allergies Mother      Thyroid Disease Mother      Hypertension Father      C.A.D. Father      Hyperlipidemia Father      CEREBROVASCULAR DISEASE Father      Mild. 72 y/o     CEREBROVASCULAR DISEASE Maternal Grandmother    No breast, uterine, ovarian or colon cancer  Sister with myeloproliferative disorder  Uncle dies of autoimmune lung disease in 50s    ROS:  Answers for HPI/ROS submitted by the patient on 2017   TERESO 7 TOTAL SCORE: 7  Q1: Little interest or pleasure in doing things: 0=Not at all  Q2: Feeling down, depressed or hopeless: 1=Several days  PHQ-2 Score: 1  General Symptoms: No  Skin Symptoms: Yes  HENT Symptoms: Yes  EYE SYMPTOMS: No  HEART SYMPTOMS: No  LUNG SYMPTOMS: Yes  INTESTINAL SYMPTOMS: Yes  URINARY SYMPTOMS:  "No  GYNECOLOGIC SYMPTOMS: No  BREAST SYMPTOMS: No  SKELETAL SYMPTOMS: No  BLOOD SYMPTOMS: No  NERVOUS SYSTEM SYMPTOMS: No  MENTAL HEALTH SYMPTOMS: Yes  Changes in hair: No  Changes in moles/birth marks: No  Itching: Yes  Rashes: Yes  Changes in nails: No  Acne: Yes  Hair in places you don't want it: No  Change in facial hair: No  Warts: No  Non-healing sores: No  Scarring: No  Flaking of skin: Yes  Color changes of hands/feet in cold : No  Sun sensitivity: No  Skin thickening: No  Ear pain: No  Ear discharge: No  Hearing loss: No  Tinnitus: Yes  Nosebleeds: No  Congestion: Yes  Sinus pain: Yes  Trouble swallowing: No   Voice hoarseness: No  Mouth sores: No  Sore throat: No  Tooth pain: No  Gum tenderness: No  Bleeding gums: No  Change in taste: No  Change in sense of smell: No  Dry mouth: No  Hearing aid used: No  Neck lump: No  Cough: Yes  Sputum or phlegm: Yes  Coughing up blood: No  Difficulty breating or shortness of breath: No  Snoring: No  Wheezing: No  Difficulty breathing on exertion: No  Respiratory pain: No  Nighttime Cough: Yes  Difficulty breathing when lying flat: No  Heart burn or indigestion: Yes  Nausea: Yes  Vomiting: No  Abdominal pain: No  Bloating: No  Constipation: No  Diarrhea: No  Blood in stool: No  Black stools: No  Rectal or Anal pain: No  Fecal incontinence: No  Rectal bleeding: No  Yellowing of skin or eyes: No  Vomit with blood: No  Change in stools: No  Hemorrhoids: No  Nervous or Anxious: Yes  Depression: No  Trouble sleeping: Yes  Trouble thinking or concentrating: No  Mood changes: Yes  Panic attacks: No    O:  Vitals:    04/10/17 1011   BP: 124/77   Pulse: 80   Weight: 60.5 kg (133 lb 4.8 oz)   Height: 1.699 m (5' 6.89\")      General: NAD, A&Ox3  Ears: TM without bulging or erythema, small wax in left ear  Neck: No thyromegaly, No thyroid nodules  Resp: Lungs CTA B/L  CV: RRR, No m/r/g  Breasts: Symmetrical, there is a likely cyst at the 4 o'clock position of the left breast that is " mobile and nontender, no erythema of tissue, no nipple discharge, no lympadenopathy bilaterally  Abdomen: Soft, NT, ND  Genitourinary:    External Genitalia: General appearance; normal, Hair distribution; normal, Lesions absent  Urethral Meatus: Size normal, Location normal, Lesions absent, Prolapse absent  Urethra: Fullness absent  Bladder: Fullness absent, Masses absent, Tenderness absent  Vagina: General appearance normal, Estrogen effect normal, Discharge absent, Lesions absent, Pelvic support normal  Cervix: General appearance normal, Lesions absent, Discharge absent, Tenderness absent, Enlargement absent, Nodularity absent  Uterus: Size normal, Contour normal, Position normal, Masses absent, Consistency; normal, Tenderness absent  Adenexa: Masses absent, Tenderness absent     A/P: 53 yo  presents for annual well woman exam  1) Annual exam: Normal breast and pelvic exam  2) Screening for malignant neoplasm of cervix: Due 9/2018  3) UTD on screenings  4) Skin rash/Itching: Did place referral for dermatology and allergy per patient request today.  She does have multiple food allergies as well as seasonal allergies.  5) Ear tinnitus: Unsure of cause, no abnormality on exam, recommend f/u with PCP  6) Acne: given refill of Retin-A cream today  6) RTC in 1 year for annual exam and pap, earlier with any concerns.    Sandra Zapien MD    Again, thank you for allowing me to participate in the care of your patient.      Sincerely,    Sandra Zapien MD

## 2017-04-10 NOTE — PROGRESS NOTES
Annual Well Woman Exam  4/10/17    Reason for visit: Annual exam    HPI: Patient is a 53 yo  who presents today for annual well woman exam.  Overall doing well but has had some issues in the last few months that have been causing her some anxiety.  She did have a breast lump on the left side which was evaluated and she was told it was benign and had a diagnostic mammogram and ultrasound without concerning findings.  She also suffered a terrible sinus infection which she had treated, but then she had a bad reaction to the antibiotics she was given and caused her further illness.  She also got Influenza despite receiving the vaccination.  She does get some ringing in her ears and would like these looked at today to make sure nothing is wrong.  She has been having anxiety secondary to all these issues, but this is assisted with meditation and family support.    She states she has been dealing with lots of itchy skin with occasional rashes, she is desiring to see a dermatologist and allergist regarding these issues.    From a GYN perspective, she has no other concerns.  She would like a refill of her Retin-A cream today as well.    Past OB/GYN History:  : 1 CS, 1   Menses: At her last annual, patient had gone almost 9 months without a period but then she started to have cycles again in the  of  and had normal monthly menses for 5 months, and then they stopped again for a few months.  She did have another 3 day bleeding cycle in March of this year.  She denies any other menopausal symptoms at this time.  Sexually active with , vasectomy for contraception  Denies dyspareunia or low sexual function  Pap smear History: Last pap 2013 NILM, HPV negative, no history of abnormal pap, due again 2018  Does have history of breast cysts and mastodynia, [facility]  Had recent mammogram and US without concerning findings for cancer in 2017.    Past Medical History:    Diagnosis Date     Acne      Eczema      Past Surgical History:   Procedure Laterality Date     BREAST SURGERY      Fibroadenoma left breast. Age 17.      SECTION       EXCISE BREAST CYST/FIBROADENOMA/TUMOR/DUCT LESION/NIPPLE LESION/AREOLAR LESION  age 17     EXCISE CYST THYROGLOSSAL DUCT         Current Outpatient Prescriptions on File Prior to Visit:  Omega-3 Fatty Acids (OMEGA-3 FISH OIL PO) Take 1 g by mouth   calcium carbonate (OS-KRISTEL 500 MG [place]. [place]) 500 MG tablet Take 500 mg by mouth 2 times daily   multivitamin, therapeutic with minerals (MULTI-VITAMIN) TABS Take 1 tablet by mouth daily     No current facility-administered medications on file prior to visit.      Allergies   Allergen Reactions     Sulfites Hives     Social History   Substance Use Topics     Smoking status: Never Smoker     Smokeless tobacco: Never Used     Alcohol use No     Family History   Problem Relation Age of Onset     Allergies Mother      Thyroid Disease Mother      Hypertension Father      C.A.D. Father      Hyperlipidemia Father      CEREBROVASCULAR DISEASE Father      Mild. 74 y/o     CEREBROVASCULAR DISEASE Maternal Grandmother    No breast, uterine, ovarian or colon cancer  Sister with myeloproliferative disorder  Uncle dies of autoimmune lung disease in 50s    ROS:  Answers for HPI/ROS submitted by the patient on 2017   TERESO 7 TOTAL SCORE: 7  Q1: Little interest or pleasure in doing things: 0=Not at all  Q2: Feeling down, depressed or hopeless: 1=Several days  PHQ-2 Score: 1  General Symptoms: No  Skin Symptoms: Yes  HENT Symptoms: Yes  EYE SYMPTOMS: No  HEART SYMPTOMS: No  LUNG SYMPTOMS: Yes  INTESTINAL SYMPTOMS: Yes  URINARY SYMPTOMS: No  GYNECOLOGIC SYMPTOMS: No  BREAST SYMPTOMS: No  SKELETAL SYMPTOMS: No  BLOOD SYMPTOMS: No  NERVOUS SYSTEM SYMPTOMS: No  MENTAL HEALTH SYMPTOMS: Yes  Changes in hair: No  Changes in moles/birth marks: No  Itching: Yes  Rashes: Yes  Changes in nails: No  Acne: Yes  Hair in  "places you don't want it: No  Change in facial hair: No  Warts: No  Non-healing sores: No  Scarring: No  Flaking of skin: Yes  Color changes of hands/feet in cold : No  Sun sensitivity: No  Skin thickening: No  Ear pain: No  Ear discharge: No  Hearing loss: No  Tinnitus: Yes  Nosebleeds: No  Congestion: Yes  Sinus pain: Yes  Trouble swallowing: No   Voice hoarseness: No  Mouth sores: No  Sore throat: No  Tooth pain: No  Gum tenderness: No  Bleeding gums: No  Change in taste: No  Change in sense of smell: No  Dry mouth: No  Hearing aid used: No  Neck lump: No  Cough: Yes  Sputum or phlegm: Yes  Coughing up blood: No  Difficulty breating or shortness of breath: No  Snoring: No  Wheezing: No  Difficulty breathing on exertion: No  Respiratory pain: No  Nighttime Cough: Yes  Difficulty breathing when lying flat: No  Heart burn or indigestion: Yes  Nausea: Yes  Vomiting: No  Abdominal pain: No  Bloating: No  Constipation: No  Diarrhea: No  Blood in stool: No  Black stools: No  Rectal or Anal pain: No  Fecal incontinence: No  Rectal bleeding: No  Yellowing of skin or eyes: No  Vomit with blood: No  Change in stools: No  Hemorrhoids: No  Nervous or Anxious: Yes  Depression: No  Trouble sleeping: Yes  Trouble thinking or concentrating: No  Mood changes: Yes  Panic attacks: No    O:  Vitals:    04/10/17 1011   BP: 124/77   Pulse: 80   Weight: 60.5 kg (133 lb 4.8 oz)   Height: 1.699 m (5' 6.89\")      General: NAD, A&Ox3  Ears: TM without bulging or erythema, small wax in left ear  Neck: No thyromegaly, No thyroid nodules  Resp: Lungs CTA B/L  CV: RRR, No m/r/g  Breasts: Symmetrical, there is a likely cyst at the 4 o'clock position of the left breast that is mobile and nontender, no erythema of tissue, no nipple discharge, no lympadenopathy bilaterally  Abdomen: Soft, NT, ND  Genitourinary:    External Genitalia: General appearance; normal, Hair distribution; normal, Lesions absent  Urethral Meatus: Size normal, Location " normal, Lesions absent, Prolapse absent  Urethra: Fullness absent  Bladder: Fullness absent, Masses absent, Tenderness absent  Vagina: General appearance normal, Estrogen effect normal, Discharge absent, Lesions absent, Pelvic support normal  Cervix: General appearance normal, Lesions absent, Discharge absent, Tenderness absent, Enlargement absent, Nodularity absent  Uterus: Size normal, Contour normal, Position normal, Masses absent, Consistency; normal, Tenderness absent  Adenexa: Masses absent, Tenderness absent     A/P: 53 yo  presents for annual well woman exam  1) Annual exam: Normal breast and pelvic exam  2) Screening for malignant neoplasm of cervix: Due 9/2018  3) UTD on screenings  4) Skin rash/Itching: Did place referral for dermatology and allergy per patient request today.  She does have multiple food allergies as well as seasonal allergies.  5) Ear tinnitus: Unsure of cause, no abnormality on exam, recommend f/u with PCP  6) Acne: given refill of Retin-A cream today  6) RTC in 1 year for annual exam and pap, earlier with any concerns.    Sandra Zapien MD

## 2017-07-15 ENCOUNTER — HEALTH MAINTENANCE LETTER (OUTPATIENT)
Age: 52
End: 2017-07-15

## 2018-02-22 ENCOUNTER — OFFICE VISIT - HEALTHEAST (OUTPATIENT)
Dept: FAMILY MEDICINE | Facility: CLINIC | Age: 53
End: 2018-02-22

## 2018-02-22 DIAGNOSIS — Z13.1 SCREENING FOR DIABETES MELLITUS: ICD-10-CM

## 2018-02-22 DIAGNOSIS — D72.819 LEUKOPENIA, UNSPECIFIED TYPE: ICD-10-CM

## 2018-02-22 DIAGNOSIS — L30.9 ECZEMA, UNSPECIFIED TYPE: ICD-10-CM

## 2018-02-22 DIAGNOSIS — K21.9 GASTROESOPHAGEAL REFLUX DISEASE WITHOUT ESOPHAGITIS: ICD-10-CM

## 2018-02-22 DIAGNOSIS — Z13.0 SCREENING FOR ENDOCRINE, NUTRITIONAL, METABOLIC AND IMMUNITY DISORDER: ICD-10-CM

## 2018-02-22 DIAGNOSIS — H93.13 TINNITUS OF BOTH EARS: ICD-10-CM

## 2018-02-22 DIAGNOSIS — Z13.29 SCREENING FOR THYROID DISORDER: ICD-10-CM

## 2018-02-22 DIAGNOSIS — Z13.0 SCREENING, ANEMIA, DEFICIENCY, IRON: ICD-10-CM

## 2018-02-22 DIAGNOSIS — Z13.228 SCREENING FOR ENDOCRINE, NUTRITIONAL, METABOLIC AND IMMUNITY DISORDER: ICD-10-CM

## 2018-02-22 DIAGNOSIS — Z00.00 PREVENTATIVE HEALTH CARE: ICD-10-CM

## 2018-02-22 DIAGNOSIS — Z13.220 SCREENING, LIPID: ICD-10-CM

## 2018-02-22 DIAGNOSIS — Z12.11 SCREEN FOR COLON CANCER: ICD-10-CM

## 2018-02-22 DIAGNOSIS — R00.2 PALPITATIONS: ICD-10-CM

## 2018-02-22 DIAGNOSIS — Z13.29 SCREENING FOR ENDOCRINE, NUTRITIONAL, METABOLIC AND IMMUNITY DISORDER: ICD-10-CM

## 2018-02-22 DIAGNOSIS — L70.8 OTHER ACNE: ICD-10-CM

## 2018-02-22 DIAGNOSIS — Z23 NEED FOR TDAP VACCINATION: ICD-10-CM

## 2018-02-22 DIAGNOSIS — R07.89 ANTERIOR CHEST WALL PAIN: ICD-10-CM

## 2018-02-22 DIAGNOSIS — F41.9 ANXIETY: ICD-10-CM

## 2018-02-22 DIAGNOSIS — R25.1 SHAKY: ICD-10-CM

## 2018-02-22 DIAGNOSIS — E78.5 HYPERLIPIDEMIA, UNSPECIFIED HYPERLIPIDEMIA TYPE: ICD-10-CM

## 2018-02-22 DIAGNOSIS — Z00.00 ROUTINE GENERAL MEDICAL EXAMINATION AT A HEALTH CARE FACILITY: ICD-10-CM

## 2018-02-22 DIAGNOSIS — Z13.228 SCREENING FOR METABOLIC DISORDER: ICD-10-CM

## 2018-02-22 DIAGNOSIS — Z13.21 SCREENING FOR ENDOCRINE, NUTRITIONAL, METABOLIC AND IMMUNITY DISORDER: ICD-10-CM

## 2018-02-22 DIAGNOSIS — Z91.02: ICD-10-CM

## 2018-02-22 LAB
ALBUMIN SERPL-MCNC: 4.2 G/DL (ref 3.5–5)
ALP SERPL-CCNC: 65 U/L (ref 45–120)
ALT SERPL W P-5'-P-CCNC: 26 U/L (ref 0–45)
ANION GAP SERPL CALCULATED.3IONS-SCNC: 13 MMOL/L (ref 5–18)
AST SERPL W P-5'-P-CCNC: 31 U/L (ref 0–40)
BASOPHILS # BLD AUTO: 0 THOU/UL (ref 0–0.2)
BASOPHILS NFR BLD AUTO: 0 % (ref 0–2)
BILIRUB SERPL-MCNC: 0.6 MG/DL (ref 0–1)
BUN SERPL-MCNC: 13 MG/DL (ref 8–22)
C4 SERPL-MCNC: 19 MG/DL (ref 19–59)
CALCIUM SERPL-MCNC: 9.5 MG/DL (ref 8.5–10.5)
CHLORIDE BLD-SCNC: 99 MMOL/L (ref 98–107)
CHOLEST SERPL-MCNC: 201 MG/DL
CO2 SERPL-SCNC: 25 MMOL/L (ref 22–31)
CREAT SERPL-MCNC: 0.71 MG/DL (ref 0.6–1.1)
EOSINOPHIL # BLD AUTO: 0 THOU/UL (ref 0–0.4)
EOSINOPHIL NFR BLD AUTO: 0 % (ref 0–6)
ERYTHROCYTE [DISTWIDTH] IN BLOOD BY AUTOMATED COUNT: 13 % (ref 11–14.5)
FASTING STATUS PATIENT QL REPORTED: YES
GFR SERPL CREATININE-BSD FRML MDRD: >60 ML/MIN/1.73M2
GLUCOSE BLD-MCNC: 84 MG/DL (ref 70–125)
HCT VFR BLD AUTO: 40.9 % (ref 35–47)
HDLC SERPL-MCNC: 71 MG/DL
HGB BLD-MCNC: 13 G/DL (ref 12–16)
LDLC SERPL CALC-MCNC: 119 MG/DL
LYMPHOCYTES # BLD AUTO: 1.2 THOU/UL (ref 0.8–4.4)
LYMPHOCYTES NFR BLD AUTO: 24 % (ref 20–40)
MCH RBC QN AUTO: 27.8 PG (ref 27–34)
MCHC RBC AUTO-ENTMCNC: 31.8 G/DL (ref 32–36)
MCV RBC AUTO: 87 FL (ref 80–100)
MONOCYTES # BLD AUTO: 0.4 THOU/UL (ref 0–0.9)
MONOCYTES NFR BLD AUTO: 8 % (ref 2–10)
NEUTROPHILS # BLD AUTO: 3.4 THOU/UL (ref 2–7.7)
NEUTROPHILS NFR BLD AUTO: 67 % (ref 50–70)
PLATELET # BLD AUTO: 270 THOU/UL (ref 140–440)
PMV BLD AUTO: 10.4 FL (ref 8.5–12.5)
POTASSIUM BLD-SCNC: 4.2 MMOL/L (ref 3.5–5)
PROT SERPL-MCNC: 8 G/DL (ref 6–8)
RBC # BLD AUTO: 4.68 MILL/UL (ref 3.8–5.4)
SODIUM SERPL-SCNC: 137 MMOL/L (ref 136–145)
TRIGL SERPL-MCNC: 55 MG/DL
TSH SERPL DL<=0.005 MIU/L-ACNC: 3.81 UIU/ML (ref 0.3–5)
WBC: 5.1 THOU/UL (ref 4–11)

## 2018-02-22 ASSESSMENT — MIFFLIN-ST. JEOR: SCORE: 1227.97

## 2018-02-22 NOTE — ASSESSMENT & PLAN NOTE
She still has anxiety but wants to try yoga/ meditation and self care. She is not wanting an oral daily medication but was offered. She says it is not bad enough for counseling.

## 2018-02-22 NOTE — ASSESSMENT & PLAN NOTE
Controlled by retin A, has seen derm in the past. Currently using 0.05% but hasused 0.1% in the past and wants to go back to that dose. So that was prescribed today.

## 2018-02-22 NOTE — ASSESSMENT & PLAN NOTE
She thinks this is from anxiety, but she does not want anxiety meds. Will check tsh and cbc to look for thyroid abnormality or anemia. If normal recommend follow up for further work up and potentially holter monitor.

## 2018-02-22 NOTE — ASSESSMENT & PLAN NOTE
Pap: sees a gynecologist for this.  Mammo: sees a gynecologist for this.  Colonoscopy:  recommended in March 2017 - she says she will get this through her gynecologist.  Std testing desired:  offered   Osteoporosis prevention discussed.  vitamin d levels ordered. Recommend daily calcium and vitamin d intake to keep good bone health. Recommend weight bearing exercise, no tobacco, and limit alcohol  dexa - recommended after menopause starts (last period was 7 months ago so maybe next year)  Recommend sunscreen, exercise, & healthy diet.  Offered tsh, glucose, hgb, lipid  I have had an Advance Directives discussion with the patient.   Body mass index is 20.05 kg/(m^2).   mychart active

## 2018-02-22 NOTE — ASSESSMENT & PLAN NOTE
She believes this is related to her posture.  In fact she does have tenderness in the chest wall with palpation over the muscles and ribs.  This does not seem to be a deep pain but rather I can palpate the area of tenderness and it reproduces the pain.  I have recommended physical therapy, but she declined it saying that she exercises on her own and she does not think it would be helpful.  I did try to explain that physical therapy would specifically target the muscles that are involved in posture and ensure that she is doing those exercises correctly however she still did not want to pursue this.  I will defer to her good judgment.

## 2018-02-22 NOTE — ASSESSMENT & PLAN NOTE
Has a referral from primary MD (gynecologist) to see allergy and requested C1 and C4 levels today. Orders placed.

## 2018-02-23 ENCOUNTER — RECORDS - HEALTHEAST (OUTPATIENT)
Dept: ADMINISTRATIVE | Facility: OTHER | Age: 53
End: 2018-02-23

## 2018-02-23 LAB
25(OH)D3 SERPL-MCNC: 44.2 NG/ML (ref 30–80)
25(OH)D3 SERPL-MCNC: 44.2 NG/ML (ref 30–80)

## 2018-02-25 LAB — C1INH SERPL-MCNC: 28 MG/DL (ref 21–39)

## 2018-02-27 ENCOUNTER — COMMUNICATION - HEALTHEAST (OUTPATIENT)
Dept: FAMILY MEDICINE | Facility: CLINIC | Age: 53
End: 2018-02-27

## 2018-02-27 DIAGNOSIS — Z12.31 ENCOUNTER FOR SCREENING MAMMOGRAM FOR BREAST CANCER: Primary | ICD-10-CM

## 2018-02-28 ENCOUNTER — RECORDS - HEALTHEAST (OUTPATIENT)
Dept: ADMINISTRATIVE | Facility: OTHER | Age: 53
End: 2018-02-28

## 2018-03-14 ENCOUNTER — COMMUNICATION - HEALTHEAST (OUTPATIENT)
Dept: FAMILY MEDICINE | Facility: CLINIC | Age: 53
End: 2018-03-14

## 2018-05-18 ENCOUNTER — COMMUNICATION - HEALTHEAST (OUTPATIENT)
Dept: FAMILY MEDICINE | Facility: CLINIC | Age: 53
End: 2018-05-18

## 2018-06-11 ASSESSMENT — ANXIETY QUESTIONNAIRES
4. TROUBLE RELAXING: SEVERAL DAYS
2. NOT BEING ABLE TO STOP OR CONTROL WORRYING: SEVERAL DAYS
7. FEELING AFRAID AS IF SOMETHING AWFUL MIGHT HAPPEN: SEVERAL DAYS
5. BEING SO RESTLESS THAT IT IS HARD TO SIT STILL: SEVERAL DAYS
GAD7 TOTAL SCORE: 7
3. WORRYING TOO MUCH ABOUT DIFFERENT THINGS: SEVERAL DAYS
7. FEELING AFRAID AS IF SOMETHING AWFUL MIGHT HAPPEN: SEVERAL DAYS
GAD7 TOTAL SCORE: 7
6. BECOMING EASILY ANNOYED OR IRRITABLE: SEVERAL DAYS
1. FEELING NERVOUS, ANXIOUS, OR ON EDGE: SEVERAL DAYS

## 2018-06-11 ASSESSMENT — ENCOUNTER SYMPTOMS
ORTHOPNEA: 0
BACK PAIN: 1
HYPOTENSION: 0
MUSCLE WEAKNESS: 0
LIGHT-HEADEDNESS: 1
HEADACHES: 1
SPEECH CHANGE: 0
WEIGHT GAIN: 0
DECREASED APPETITE: 0
DECREASED CONCENTRATION: 1
TREMORS: 1
SINUS PAIN: 1
DISTURBANCES IN COORDINATION: 0
MEMORY LOSS: 0
NUMBNESS: 0
POLYDIPSIA: 0
NECK MASS: 0
EXERCISE INTOLERANCE: 0
FEVER: 0
BREAST PAIN: 1
WEAKNESS: 0
NIGHT SWEATS: 0
HALLUCINATIONS: 0
INCREASED ENERGY: 0
CHILLS: 0
HOARSE VOICE: 0
SYNCOPE: 0
NECK PAIN: 0
LOSS OF CONSCIOUSNESS: 0
TROUBLE SWALLOWING: 0
TASTE DISTURBANCE: 0
INSOMNIA: 1
MUSCLE CRAMPS: 0
PALPITATIONS: 1
SLEEP DISTURBANCES DUE TO BREATHING: 0
DEPRESSION: 0
ALTERED TEMPERATURE REGULATION: 0
MYALGIAS: 0
WEIGHT LOSS: 0
POLYPHAGIA: 0
LEG PAIN: 0
NERVOUS/ANXIOUS: 1
PANIC: 1
PARALYSIS: 0
SINUS CONGESTION: 0
SORE THROAT: 0
ARTHRALGIAS: 1
FATIGUE: 1
SEIZURES: 0
HYPERTENSION: 0
DIZZINESS: 0
SMELL DISTURBANCE: 0
JOINT SWELLING: 0
BREAST MASS: 0
STIFFNESS: 0
TINGLING: 1

## 2018-06-14 ENCOUNTER — OFFICE VISIT (OUTPATIENT)
Dept: OBGYN | Facility: CLINIC | Age: 53
End: 2018-06-14
Attending: OBSTETRICS & GYNECOLOGY
Payer: COMMERCIAL

## 2018-06-14 VITALS
HEART RATE: 97 BPM | WEIGHT: 126.9 LBS | BODY MASS INDEX: 19.92 KG/M2 | SYSTOLIC BLOOD PRESSURE: 146 MMHG | DIASTOLIC BLOOD PRESSURE: 84 MMHG | HEIGHT: 67 IN

## 2018-06-14 DIAGNOSIS — Z12.4 SCREENING FOR MALIGNANT NEOPLASM OF CERVIX: Primary | ICD-10-CM

## 2018-06-14 DIAGNOSIS — R00.2 PALPITATIONS: ICD-10-CM

## 2018-06-14 DIAGNOSIS — R07.9 CHEST PAIN, UNSPECIFIED TYPE: ICD-10-CM

## 2018-06-14 DIAGNOSIS — L50.9 HIVES: ICD-10-CM

## 2018-06-14 DIAGNOSIS — R23.9 SKIN CHANGE: ICD-10-CM

## 2018-06-14 DIAGNOSIS — Z12.31 ENCOUNTER FOR SCREENING MAMMOGRAM FOR BREAST CANCER: ICD-10-CM

## 2018-06-14 PROCEDURE — G0476 HPV COMBO ASSAY CA SCREEN: HCPCS | Performed by: OBSTETRICS & GYNECOLOGY

## 2018-06-14 PROCEDURE — G0145 SCR C/V CYTO,THINLAYER,RESCR: HCPCS | Performed by: OBSTETRICS & GYNECOLOGY

## 2018-06-14 PROCEDURE — 87624 HPV HI-RISK TYP POOLED RSLT: CPT | Performed by: OBSTETRICS & GYNECOLOGY

## 2018-06-14 ASSESSMENT — ANXIETY QUESTIONNAIRES
3. WORRYING TOO MUCH ABOUT DIFFERENT THINGS: SEVERAL DAYS
1. FEELING NERVOUS, ANXIOUS, OR ON EDGE: SEVERAL DAYS
5. BEING SO RESTLESS THAT IT IS HARD TO SIT STILL: NOT AT ALL
7. FEELING AFRAID AS IF SOMETHING AWFUL MIGHT HAPPEN: SEVERAL DAYS
6. BECOMING EASILY ANNOYED OR IRRITABLE: SEVERAL DAYS
GAD7 TOTAL SCORE: 6
2. NOT BEING ABLE TO STOP OR CONTROL WORRYING: SEVERAL DAYS

## 2018-06-14 ASSESSMENT — PATIENT HEALTH QUESTIONNAIRE - PHQ9: 5. POOR APPETITE OR OVEREATING: SEVERAL DAYS

## 2018-06-14 NOTE — PROGRESS NOTES
"Annual Well Woman Exam  18    Reason for visit: Annual exam    HPI: Patient is a 54 yo  who presents for annual well woman exam.  Patient today has a very pan positive review of systems.  We began by discussing that she has found her anxiety has been worse since we last saw each other.  She has excessive worrying including about her own health and that something is wrong with her, as well as with her family in [facility].  She feels like she is always anxious.  She has many episodes of having shaking in sleep and feeling dizziness.  She frequently gets palpitations and has had some panic attacks.  She is not sleeping well.  She has some good days followed by bad days.     Patient is very concerned something is wrong with her heart.  She has had some episodes of chest pain and palpitations.  She has discussed this with other providers and she feels that it is not been worked up to her liking.      She also complains of burning substernal chest pain and feeling like she brings up air in her esophagus.  She has a history of heartburn and was taking omeprazole but stopped taking it because she was afraid of taking too many pills and she wanted to try to treat things naturally.    Patient also is concerned about repetitive outbreaks of hives and rash that she gets.  She has found this related to eating food with food additive and tries to avoid those foods because of this.  But she is also getting them at other times and is concerned she could have something wrong like abnormal \"complement levels or mast cell disorder\".    Of note, patient's mother had a mixed connective tissue disorder which was not diagnosed until close to her death and there are many other family members with autoimmune issues.  The patient is fearful that something will be missed with regards to diagnosing problems with her health.    Past OB/GYN History:  : 1 CS, 1   Menses: Has not had a period in over a year, denies menopausal " symptoms such as hot flashes or night sweats  Sexually active with , vasectomy for contraception  Denies dyspareunia or low sexual function  Pap smear History: Last pap 2013 NILM, HPV negative, no history of abnormal pap, due today  Does have history of breast cysts and mastodynia, [facility]  Had recent mammogram and US without concerning findings for cancer in 2017.  Is due for repeat and ordered for patient.    Past Medical History:   Diagnosis Date     Acne      Breast disorder      Eczema      Past Surgical History:   Procedure Laterality Date     BREAST SURGERY      Fibroadenoma left breast. Age 17.      SECTION       EXCISE BREAST CYST/FIBROADENOMA/TUMOR/DUCT LESION/NIPPLE LESION/AREOLAR LESION  age 17     EXCISE CYST THYROGLOSSAL DUCT         Current Outpatient Prescriptions on File Prior to Visit:  B-12, METHYLCOBALAMIN, SL    calcium carbonate (OS-KRISTEL 500 MG [place]. CA) 500 MG tablet Take 500 mg by mouth 2 times daily   multivitamin, therapeutic with minerals (MULTI-VITAMIN) TABS Take 1 tablet by mouth daily   Omega-3 Fatty Acids (OMEGA-3 FISH OIL PO) Take 1 g by mouth   tretinoin (RETIN-A) 0.05 % cream Apply topically At Bedtime     No current facility-administered medications on file prior to visit.      Allergies   Allergen Reactions     Sulfites Hives     Social History   Substance Use Topics     Smoking status: Never Smoker     Smokeless tobacco: Never Used     Alcohol use No     Family History   Problem Relation Age of Onset     Allergies Mother      Thyroid Disease Mother      Hypothyroidism     Hypertension Father      C.A.D. Father      Hyperlipidemia Father      CEREBROVASCULAR DISEASE Father      Mild. 72 y/o     CEREBROVASCULAR DISEASE Maternal Grandmother      Hypertension Brother      ROS:  Answers for HPI/ROS submitted by the patient on 2018   TERESO 7 TOTAL SCORE: 7  General Symptoms: Yes  Skin Symptoms: No  HENT Symptoms: Yes  EYE SYMPTOMS:  No  HEART SYMPTOMS: Yes  LUNG SYMPTOMS: No  INTESTINAL SYMPTOMS: No  URINARY SYMPTOMS: No  GYNECOLOGIC SYMPTOMS: No  BREAST SYMPTOMS: Yes  SKELETAL SYMPTOMS: Yes  BLOOD SYMPTOMS: No  NERVOUS SYSTEM SYMPTOMS: Yes  MENTAL HEALTH SYMPTOMS: Yes  Fever: No  Loss of appetite: No  Weight loss: No  Weight gain: No  Fatigue: Yes  Night sweats: No  Chills: No  Increased stress: Yes  Excessive hunger: No  Excessive thirst: No  Feeling hot or cold when others believe the temperature is normal: No  Loss of height: No  Post-operative complications: No  Surgical site pain: No  Hallucinations: No  Change in or Loss of Energy: No  Hyperactivity: No  Confusion: No  Ear pain: No  Ear discharge: No  Hearing loss: No  Tinnitus: Yes  Nosebleeds: No  Congestion: No  Sinus pain: Yes  Trouble swallowing: No   Voice hoarseness: No  Mouth sores: No  Sore throat: No  Tooth pain: No  Gum tenderness: No  Bleeding gums: No  Change in taste: No  Change in sense of smell: No  Dry mouth: No  Hearing aid used: No  Neck lump: No  Chest pain or pressure: Yes  Fast or irregular heartbeat: Yes  Pain in legs with walking: No  Trouble breathing while lying down: No  Fingers or toes appear blue: No  High blood pressure: No  Low blood pressure: No  Fainting: No  Murmurs: No  Pacemaker: No  Varicose veins: No  Edema or swelling: No  Wake up at night with shortness of breath: No  Light-headedness: Yes  Exercise intolerance: No  Back pain: Yes  Muscle aches: No  Neck pain: No  Swollen joints: No  Joint pain: Yes  Bone pain: Yes  Muscle cramps: No  Muscle weakness: No  Joint stiffness: No  Bone fracture: No  Trouble with coordination: No  Dizziness or trouble with balance: No  Fainting or black-out spells: No  Memory loss: No  Headache: Yes  Seizures: No  Speech problems: No  Tingling: Yes  Tremor: Yes  Weakness: No  Difficulty walking: No  Paralysis: No  Numbness: No  Discharge: No  Lumps: No  Pain: Yes  Nipple retraction: No  Nervous or Anxious:  "Yes  Depression: No  Trouble sleeping: Yes  Trouble thinking or concentrating: Yes  Mood changes: Yes  Panic attacks: Yes  PHQ-2 Score: 2    O:  Vitals:    06/14/18 0912   BP: 146/84   Pulse: 97   Weight: 57.6 kg (126 lb 14.4 oz)   Height: 1.699 m (5' 6.89\")      General: NAD, A&Ox3  Neck: No thyromegaly, No thyroid nodules appreciated  Lungs: CTA B/L  CV: RRR  Breasts: Symmetrical, No lymphadenopathy, skin changes, nipple discharge or nodules appreciated bilaterally, does have fibrocystic change/scar tissue in upper outer left breast where had prior breast biopsy  Abdomen: Soft, NT, ND  Genitourinary:   External Genitalia:  General appearance; normal, Hair distribution; normal, Lesions absent  Urethral Meatus:  Size normal, Location normal, Lesions absent  Urethra:  Fullness absent, Masses absent  Bladder:  Fullness absent, Masses absent, Tenderness absent  Vagina:  General appearance normal, Estrogen effect normal, Discharge absent, Lesions absent  Cervix:  General appearance normal, Lesions absent, Discharge absent, Tenderness absent  Uterus:  Size normal, Position normal, Masses absent, Support normal, Tenderness absent  Adenexa:  Masses absent, Tenderness absent, Enlargement absent     A/P: 52 yo  postmenopausal female presents for annual well woman exam with many concerns about health   1) Normal breast and pelvic exam  2) Screening for malignant neoplasm of cervix: Pap smear with HPV cotesting completed today, will notify patient of results via My Chart.  If NILM, HPV negative, repeat in 5 years.  3) Breast cancer screening: Due for mammogram, ordered today  4) Anxiety: Patient has severe symptoms affecting her quality of life.  I discussed recommendations for psychology or psychiatry intervention based on her symptoms and concern that some of her physical ailments are more consistent with manifestations secondary to her anxiety.  I tried to encourage patient to access care, but she wants to wait.  I " asked she come back to see me in 3 months for a check-in.  5) Palpitations/Chest pain: Patient very concerned there is something wrong with her heart.  Did make referral to Cardiology today to evaluate this, patient needs reassurance that everything is ok with her heart, I feel she will not access care for mental health until this is addressed  6) Rash/Hives: Referral made to allergist for patch testing and discuss her symptoms and family history  7) RTC in 3 months for mood check, earlier if desired, patient has significant anxiety and this needs to be addressed as soon as she is accepting of intervention    Sandra Zapien MD

## 2018-06-14 NOTE — LETTER
"6/14/2018       RE: Isha Vergara  [address] [place] [address]     Dear Colleague,    Thank you for referring your patient, Isha Vergara, to the [facility] at [facility]. Please see a copy of my visit note below.    Annual Well Woman Exam  6/14/18    Reason for visit: Annual exam    HPI: Patient is a 52 yo  who presents for annual well woman exam.  Patient today has a very pan positive review of systems.  We began by discussing that she has found her anxiety has been worse since we last saw each other.  She has excessive worrying including about her own health and that something is wrong with her, as well as with her family in [facility].  She feels like she is always anxious.  She has many episodes of having shaking in sleep and feeling dizziness.  She frequently gets palpitations and has had some panic attacks.  She is not sleeping well.  She has some good days followed by bad days.     Patient is very concerned something is wrong with her heart.  She has had some episodes of chest pain and palpitations.  She has discussed this with other providers and she feels that it is not been worked up to her liking.      She also complains of burning substernal chest pain and feeling like she brings up air in her esophagus.  She has a history of heartburn and was taking omeprazole but stopped taking it because she was afraid of taking too many pills and she wanted to try to treat things naturally.    Patient also is concerned about repetitive outbreaks of hives and rash that she gets.  She has found this related to eating food with food additive and tries to avoid those foods because of this.  But she is also getting them at other times and is concerned she could have something wrong like abnormal \"complement levels or mast cell disorder\".    Of note, patient's mother had a mixed connective tissue disorder which was not diagnosed until close to her " death and there are many other family members with autoimmune issues.  The patient is fearful that something will be missed with regards to diagnosing problems with her health.    Past OB/GYN History:  : 1 CS, 1   Menses: Has not had a period in over a year, denies menopausal symptoms such as hot flashes or night sweats  Sexually active with , vasectomy for contraception  Denies dyspareunia or low sexual function  Pap smear History: Last pap 2013 NILM, HPV negative, no history of abnormal pap, due today  Does have history of breast cysts and mastodynia, [facility]  Had recent mammogram and US without concerning findings for cancer in 2017.  Is due for repeat and ordered for patient.    Past Medical History:   Diagnosis Date     Acne      Breast disorder      Eczema      Past Surgical History:   Procedure Laterality Date     BREAST SURGERY      Fibroadenoma left breast. Age 17.      SECTION       EXCISE BREAST CYST/FIBROADENOMA/TUMOR/DUCT LESION/NIPPLE LESION/AREOLAR LESION  age 17     EXCISE CYST THYROGLOSSAL DUCT         Current Outpatient Prescriptions on File Prior to Visit:  B-12, METHYLCOBALAMIN, SL    calcium carbonate (OS-KRISTEL 500 MG [place]. CA) 500 MG tablet Take 500 mg by mouth 2 times daily   multivitamin, therapeutic with minerals (MULTI-VITAMIN) TABS Take 1 tablet by mouth daily   Omega-3 Fatty Acids (OMEGA-3 FISH OIL PO) Take 1 g by mouth   tretinoin (RETIN-A) 0.05 % cream Apply topically At Bedtime     No current facility-administered medications on file prior to visit.      Allergies   Allergen Reactions     Sulfites Hives     Social History   Substance Use Topics     Smoking status: Never Smoker     Smokeless tobacco: Never Used     Alcohol use No     Family History   Problem Relation Age of Onset     Allergies Mother      Thyroid Disease Mother      Hypothyroidism     Hypertension Father      C.A.D. Father      Hyperlipidemia Father       "CEREBROVASCULAR DISEASE Father      Mild. 72 y/o     CEREBROVASCULAR DISEASE Maternal Grandmother      Hypertension Brother      ROS:  Answers for HPI/ROS submitted by the patient on 6/11/2018     O:  Vitals:    06/14/18 0912   BP: 146/84   Pulse: 97   Weight: 57.6 kg (126 lb 14.4 oz)   Height: 1.699 m (5' 6.89\")      General: NAD, A&Ox3  Neck: No thyromegaly, No thyroid nodules appreciated  Lungs: CTA B/L  CV: RRR  Breasts: Symmetrical, No lymphadenopathy, skin changes, nipple discharge or nodules appreciated bilaterally, does have fibrocystic change/scar tissue in upper outer left breast where had prior breast biopsy  Abdomen: Soft, NT, ND  Genitourinary:   External Genitalia:  General appearance; normal, Hair distribution; normal, Lesions absent  Urethral Meatus:  Size normal, Location normal, Lesions absent  Urethra:  Fullness absent, Masses absent  Bladder:  Fullness absent, Masses absent, Tenderness absent  Vagina:  General appearance normal, Estrogen effect normal, Discharge absent, Lesions absent  Cervix:  General appearance normal, Lesions absent, Discharge absent, Tenderness absent  Uterus:  Size normal, Position normal, Masses absent, Support normal, Tenderness absent  Adenexa:  Masses absent, Tenderness absent, Enlargement absent     A/P: 54 yo  postmenopausal female presents for annual well woman exam with many concerns about health   1) Normal breast and pelvic exam  2) Screening for malignant neoplasm of cervix: Pap smear with HPV cotesting completed today, will notify patient of results via My Chart.  If NILM, HPV negative, repeat in 5 years.  3) Breast cancer screening: Due for mammogram, ordered today  4) Anxiety: Patient has severe symptoms affecting her quality of life.  I discussed recommendations for psychology or psychiatry intervention based on her symptoms and concern that some of her physical ailments are more consistent with manifestations secondary to her anxiety.  I tried to " encourage patient to access care, but she wants to wait.  I asked she come back to see me in 3 months for a check-in.  5) Palpitations/Chest pain: Patient very concerned there is something wrong with her heart.  Did make referral to Cardiology today to evaluate this, patient needs reassurance that everything is ok with her heart, I feel she will not access care for mental health until this is addressed  6) Rash/Hives: Referral made to allergist for patch testing and discuss her symptoms and family history  7) RTC in 3 months for mood check, earlier if desired, patient has significant anxiety and this needs to be addressed as soon as she is accepting of intervention    Sandra Zapien MD

## 2018-06-14 NOTE — MR AVS SNAPSHOT
After Visit Summary   6/14/2018    Isha Vergara    MRN: [id number]           Patient Information     Date Of Birth          1965        Visit Information        Provider Department      6/14/2018 9:00 AM Sandra Zapien MD [facility]        Today's Diagnoses     Screening for malignant neoplasm of cervix    -  1    Chest pain, unspecified type        Palpitations        Skin change        Hives        Encounter for screening mammogram for breast cancer           Follow-ups after your visit        Additional Services     ALLERGY/ASTHMA ADULT REFERRAL       Your provider has referred you [facility]: [facility] [phone]  [email or web address]    Please be aware that coverage of these services is subject to the terms and limitations of your health insurance plan.  Call member services at your health plan with any benefit or coverage questions.      Please bring the following with you to your appointment:    (1) Any X-Rays, CTs or MRIs which have been performed.  Contact the facility where they were done to arrange for  prior to your scheduled appointment.    (2) List of current medications  (3) This referral request   (4) Any documents/labs given to you for this referral            CARDIOLOGY EVAL ADULT REFERRAL       Preferred location:  [facility] - [phone]  [email or web address]    Please be aware that coverage of these services is subject to the terms and limitations of your health insurance plan.  Call member services at your health plan with any benefit or coverage questions.      Please bring the following to your appointment:  Any x-rays, CTs or MRIs which have been performed. Contact the facility where they were done to arrange for  prior to your scheduled appointment.    List of current medications  This referral request   Any  "documents/labs given to you for this referral                  Follow-up notes from your care team     Return in about 3 months (around 9/14/2018) for Mood check.      Future tests that were ordered for you today     Open Future Orders        Priority Expected Expires Ordered    Mammo Screening digital (bilateral) Routine  6/14/2019 6/14/2018            Who to contact     Please call your clinic at [phone] to:    Ask questions about your health    Make or cancel appointments    Discuss your medicines    Learn about your test results    Speak to your doctor            Additional Information About Your Visit        Kamelio Information     Kamelio gives you secure access to your electronic health record. If you see a primary care provider, you can also send messages to your care team and make appointments. If you have questions, please call your primary care clinic.  If you do not have a primary care provider, please call [phone] and they will assist you.      Kamelio is an electronic gateway that provides easy, online access to your medical records. With Kamelio, you can request a clinic appointment, read your test results, renew a prescription or communicate with your care team.     To access your existing account, please contact your [facility] or call [phone] for assistance.        Care EveryWhere ID     This is your Care EveryWhere ID. This could be used by other organizations to access your [place] medical records  [id number]        Your Vitals Were     Pulse Height Last Period BMI (Body Mass Index)          97 1.699 m (5' 6.89\") 12/28/2014 19.94 kg/m2         Blood Pressure from Last 3 Encounters:   06/14/18 146/84   04/10/17 124/77   02/22/17 144/81    Weight from Last 3 Encounters:   06/14/18 57.6 kg (126 lb 14.4 oz)   04/10/17 60.5 kg (133 lb 4.8 oz)   02/22/17 59.2 kg (130 lb 9.6 oz)              We Performed the Following     ALLERGY/ASTHMA ADULT REFERRAL     " CARDIOLOGY EVAL ADULT REFERRAL     HPV High Risk Types DNA Cervical     Obtaining, preparing and conveyance of cervical or vaginal smear to laboratory.     Pap imaged thin layer screen with HPV - recommended age 30 - 65 years (select HPV order below)        Primary Care Provider    None Specified       No primary provider on file.        Equal Access to Services     MARITZA HERNANDEZ : Hadii aad ku hadmeganelena Machelle, tanisha luqcris, sharri kaelodia delfinagermaniadenilson, jenelle pollard mayomaren gastonramonajulieta brown. So [facility] [phone].    ATENCIÓN: Si habla español, tiene a aquino disposición servicios gratuitos de asistencia lingüística. Llame al [phone].    We comply with applicable federal civil rights laws and [place] laws. We do not discriminate on the basis of race, color, national origin, age, disability, sex, sexual orientation, or gender identity.            Thank you!     Thank you for choosing [facility]  for your care. Our goal is always to provide you with excellent care. Hearing back from our patients is one way we can continue to improve our services. Please take a few minutes to complete the written survey that you may receive in the mail after your visit with us. Thank you!             Your Updated Medication List - Protect others around you: Learn how to safely use, store and throw away your medicines at www.disposemymeds.org.          This list is accurate as of 6/14/18 10:03 AM.  Always use your most recent med list.                   Brand Name Dispense Instructions for use Diagnosis    B-12 (METHYLCOBALAMIN) SL           calcium carbonate 500 MG tablet    OS-KRISTEL 500 mg [place]. [place]     Take 500 mg by mouth 2 times daily        Multi-vitamin Tabs tablet      Take 1 tablet by mouth daily        OMEGA-3 FISH OIL PO      Take 1 g by mouth        tretinoin 0.05 % cream    RETIN-A    45 g    Apply topically At Bedtime    Acne, unspecified acne type

## 2018-06-15 ENCOUNTER — RADIANT APPOINTMENT (OUTPATIENT)
Dept: MAMMOGRAPHY | Facility: CLINIC | Age: 53
End: 2018-06-15
Payer: COMMERCIAL

## 2018-06-15 DIAGNOSIS — Z12.31 ENCOUNTER FOR SCREENING MAMMOGRAM FOR BREAST CANCER: ICD-10-CM

## 2018-06-15 ASSESSMENT — PATIENT HEALTH QUESTIONNAIRE - PHQ9: SUM OF ALL RESPONSES TO PHQ QUESTIONS 1-9: 3

## 2018-06-18 LAB
COPATH REPORT: NORMAL
PAP: NORMAL

## 2018-06-20 LAB
FINAL DIAGNOSIS: NORMAL
HPV HR 12 DNA CVX QL NAA+PROBE: NEGATIVE
HPV16 DNA SPEC QL NAA+PROBE: NEGATIVE
HPV18 DNA SPEC QL NAA+PROBE: NEGATIVE
SPECIMEN DESCRIPTION: NORMAL
SPECIMEN SOURCE CVX/VAG CYTO: NORMAL

## 2018-12-27 ENCOUNTER — COMMUNICATION - HEALTHEAST (OUTPATIENT)
Dept: FAMILY MEDICINE | Facility: CLINIC | Age: 53
End: 2018-12-27

## 2018-12-27 DIAGNOSIS — L70.8 OTHER ACNE: ICD-10-CM

## 2019-01-03 DIAGNOSIS — L70.9 ACNE, UNSPECIFIED ACNE TYPE: ICD-10-CM

## 2019-01-03 RX ORDER — TRETINOIN 0.5 MG/G
CREAM TOPICAL AT BEDTIME
Qty: 45 G | Refills: 3 | Status: SHIPPED | OUTPATIENT
Start: 2019-01-03 | End: 2019-02-05

## 2019-02-05 ENCOUNTER — TELEPHONE (OUTPATIENT)
Dept: OBGYN | Facility: CLINIC | Age: 54
End: 2019-02-05

## 2019-02-05 DIAGNOSIS — L70.9 ACNE, UNSPECIFIED ACNE TYPE: ICD-10-CM

## 2019-02-05 RX ORDER — TRETINOIN 0.5 MG/G
CREAM TOPICAL AT BEDTIME
Qty: 45 G | Refills: 3 | Status: SHIPPED | OUTPATIENT
Start: 2019-02-05 | End: 2021-03-18

## 2019-02-05 NOTE — TELEPHONE ENCOUNTER
----- Message from Ade Llamas sent at 2/5/2019  9:24 AM CST -----  Regarding: Retin A Cream to be sent to [facility]  Contact: [phone]  Pt calling to get the  tretinoin (RETIN-A)  0.1% cream sent to [facility] pharmacy [address]  [address] [place] [address]   Contact number [phone]  Thanks,  Ade  Call ctr  Please DO NOT send message and or reply back to sender. Call center Representatives DO NOT respond to Messages

## 2019-02-06 ENCOUNTER — TELEPHONE (OUTPATIENT)
Dept: OBGYN | Facility: CLINIC | Age: 54
End: 2019-02-06

## 2019-02-06 NOTE — TELEPHONE ENCOUNTER
Prior Authorization Approval    Authorization Effective Date: 1/7/2019  Authorization Expiration Date: 2/5/2022  Medication: tretinoin (RETIN-A) 0.05 % external cream  Approved Dose/Quantity:   Reference #: [id number]   Insurance Company: [employer] - Phone [phone] Fax [phone]  Expected CoPay:       CoPay Card Available:      Foundation Assistance Needed:    Which Pharmacy is filling the prescription (Not needed for infusion/clinic administered): [id number] - [place], [place] - [address]. [address]  Pharmacy Notified: Yes  Patient Notified: No-Pharmacy will notify patient when ready.

## 2019-02-06 NOTE — TELEPHONE ENCOUNTER
Prior Authorization Retail Medication Request    Medication/Dose: tretinion 0.05% cream  Apply topically at bedtime  ICD code (if different than what is on RX):  L70.9 acne  Previously Tried and Failed:  Has been on this medication since before 2015  Rationale:  52 years old with acne    Insurance Name:  [facility]  Insurance ID:  [id number]      Pharmacy Information (if different than what is on RX)  [facility]  Phone:  [phone]

## 2019-03-04 ENCOUNTER — TRANSFERRED RECORDS (OUTPATIENT)
Dept: HEALTH INFORMATION MANAGEMENT | Facility: CLINIC | Age: 54
End: 2019-03-04

## 2019-03-07 ENCOUNTER — OFFICE VISIT - HEALTHEAST (OUTPATIENT)
Dept: FAMILY MEDICINE | Facility: CLINIC | Age: 54
End: 2019-03-07

## 2019-03-07 DIAGNOSIS — J02.9 PHARYNGITIS, UNSPECIFIED ETIOLOGY: ICD-10-CM

## 2019-03-07 DIAGNOSIS — R09.82 POST-NASAL DRIP: ICD-10-CM

## 2019-03-07 ASSESSMENT — MIFFLIN-ST. JEOR: SCORE: 1205.29

## 2019-05-06 ENCOUNTER — TELEPHONE (OUTPATIENT)
Dept: OBGYN | Facility: CLINIC | Age: 54
End: 2019-05-06

## 2019-05-06 NOTE — TELEPHONE ENCOUNTER
Attempted to reach Isha about request to see an ENT provider. Informed her we have PCP in clinic she can see,  if she hasn't already been evaluated for throat problem. ENT referral should come from a PCP not OB/GYN. Asked her to call nurse triage back to discuss.

## 2019-05-06 NOTE — TELEPHONE ENCOUNTER
----- Message from Ale Watts sent at 5/6/2019  9:00 AM CDT -----  Regarding: FW: Referral to ENT  Contact: [phone]      ----- Message -----  From: Ethel Saavedra  Sent: 5/3/2019   4:26 PM  To: [facility]  Subject: Referral to ENT                                   Health Call Center    Phone Message    May a detailed message be left on voicemail: yes    Reason for Call: Order(s): Other:   Reason for requested:Referral to ENT, [place] E,N,T Specialist, [place], [phone]. Nestor Sung.Pt states has Throat tightness  Date needed: ASAP  Provider name: Sandra Zapien    Action Taken: Message routed to:  [facility]

## 2019-06-18 ENCOUNTER — TRANSFERRED RECORDS (OUTPATIENT)
Dept: HEALTH INFORMATION MANAGEMENT | Facility: CLINIC | Age: 54
End: 2019-06-18

## 2019-07-30 ENCOUNTER — TRANSFERRED RECORDS (OUTPATIENT)
Dept: HEALTH INFORMATION MANAGEMENT | Facility: CLINIC | Age: 54
End: 2019-07-30

## 2019-07-31 ENCOUNTER — TRANSFERRED RECORDS (OUTPATIENT)
Dept: HEALTH INFORMATION MANAGEMENT | Facility: CLINIC | Age: 54
End: 2019-07-31

## 2019-08-09 ENCOUNTER — RECORDS - HEALTHEAST (OUTPATIENT)
Dept: ADMINISTRATIVE | Facility: OTHER | Age: 54
End: 2019-08-09

## 2019-08-11 ENCOUNTER — HOSPITAL ENCOUNTER (OUTPATIENT)
Dept: ULTRASOUND IMAGING | Facility: CLINIC | Age: 54
Discharge: HOME | End: 2019-08-11

## 2019-08-11 DIAGNOSIS — R10.13 EPIGASTRIC PAIN: ICD-10-CM

## 2019-08-21 ENCOUNTER — COMMUNICATION - HEALTHEAST (OUTPATIENT)
Dept: FAMILY MEDICINE | Facility: CLINIC | Age: 54
End: 2019-08-21

## 2019-08-21 DIAGNOSIS — L70.8 OTHER ACNE: ICD-10-CM

## 2019-11-03 ENCOUNTER — HEALTH MAINTENANCE LETTER (OUTPATIENT)
Age: 54
End: 2019-11-03

## 2020-11-16 ENCOUNTER — HEALTH MAINTENANCE LETTER (OUTPATIENT)
Age: 55
End: 2020-11-16

## 2021-03-18 ENCOUNTER — E-VISIT (OUTPATIENT)
Dept: URGENT CARE | Facility: URGENT CARE | Age: 56
End: 2021-03-18
Payer: COMMERCIAL

## 2021-03-18 DIAGNOSIS — L70.9 ACNE, UNSPECIFIED ACNE TYPE: ICD-10-CM

## 2021-03-18 PROCEDURE — 99422 OL DIG E/M SVC 11-20 MIN: CPT | Performed by: PREVENTIVE MEDICINE

## 2021-03-18 RX ORDER — TRETINOIN 0.5 MG/G
CREAM TOPICAL AT BEDTIME
Qty: 45 G | Refills: 0 | Status: SHIPPED | OUTPATIENT
Start: 2021-03-18 | End: 2023-09-13

## 2021-03-18 RX ORDER — EPINEPHRINE 0.3 MG/.3ML
0.3 INJECTION SUBCUTANEOUS PRN
Qty: 2 EACH | Refills: 0 | Status: SHIPPED | OUTPATIENT
Start: 2021-03-18

## 2021-04-05 ENCOUNTER — AMBULATORY - HEALTHEAST (OUTPATIENT)
Dept: NURSING | Facility: CLINIC | Age: 56
End: 2021-04-05

## 2021-04-26 ENCOUNTER — AMBULATORY - HEALTHEAST (OUTPATIENT)
Dept: NURSING | Facility: CLINIC | Age: 56
End: 2021-04-26

## 2021-05-26 ENCOUNTER — RECORDS - HEALTHEAST (OUTPATIENT)
Dept: ADMINISTRATIVE | Facility: CLINIC | Age: 56
End: 2021-05-26

## 2021-05-27 ENCOUNTER — RECORDS - HEALTHEAST (OUTPATIENT)
Dept: ADMINISTRATIVE | Facility: CLINIC | Age: 56
End: 2021-05-27

## 2021-05-30 VITALS — WEIGHT: 128 LBS | BODY MASS INDEX: 20.09 KG/M2 | HEIGHT: 67 IN

## 2021-05-31 NOTE — TELEPHONE ENCOUNTER
RN cannot approve Refill Request    Former patient of Corrine & has not established care with another provider.  Please assign refill request to covering provider per Clinic standard process.    RN can NOT refill this medication PCP messaged that patient is overdue for Office Visit. Last office visit: Visit date not found Last Physical: 2/22/2018 Last MTM visit: Visit date not found Last visit same specialty: 3/7/2019 Kvng Johnson CNP.  Next visit within 3 mo: Visit date not found  Next physical within 3 mo: Visit date not found      [facility]ell[facility] Connection Triage/Med Refill 8/22/2019    Requested Prescriptions   Pending Prescriptions Disp Refills     tretinoin (RETIN-A) 0.1 % cream [Pharmacy Med Name: TRETINOIN 0.1% CREAM 20GM] 20 g 0     Sig: APPLY EXTERNALLY TO THE AFFECTED AREA DAILY       Topical Dermatology Medications Refill Protocol Failed - 8/21/2019  9:29 PM        Failed - Patient has had office visit/physical in last 1 year     Last office visit with prescriber/PCP: Visit date not found OR same dept: Visit date not found OR same specialty: 3/7/2019 Kvng Johnson CNP  Last physical: 2/22/2018 Last MTM visit: Visit date not found   Next visit within 3 mo: Visit date not found  Next physical within 3 mo: Visit date not found  Prescriber OR PCP: Rita Ch MD  Last diagnosis associated with med order: 1. Acne Vulgaris  - tretinoin (RETIN-A) 0.1 % cream [Pharmacy Med Name: TRETINOIN 0.1% CREAM 20GM]; APPLY EXTERNALLY TO THE AFFECTED AREA DAILY  Dispense: 20 g; Refill: 0    If protocol passes may refill for 12 months if within 3 months of last provider visit (or a total of 15 months).

## 2021-06-01 VITALS — BODY MASS INDEX: 20.88 KG/M2 | WEIGHT: 133 LBS | HEIGHT: 67 IN

## 2021-06-02 VITALS — HEIGHT: 67 IN | WEIGHT: 128 LBS | BODY MASS INDEX: 20.09 KG/M2

## 2021-06-08 NOTE — PROGRESS NOTES
Assessment:      Healthy female exam.    Anxiety, gerd and other medical concerns were dicussed above and beyond the usual annual exam.   Plan:      Problem List Items Addressed This Visit     Preventative health care - Primary     Pap: sees a gynecologist for this.  Mammo: sees a gynecologist for this.  Colonoscopy:  recommended in March 2017 - she says she will get this through her gynecologist.  Std testing desired:  offered  Osteoporosis prevention discussed.  vitamin d levels ordered. Recommend daily calcium and vitamin d intake to keep good bone health. Recommend weight bearing exercise, no tobacco, and limit alcohol  dexa - recommended after menopause starts  Recommend sunscreen, exercise, & healthy diet.  Offered tsh, glucose, hgb, lipid  I have had an Advance Directives discussion with the patient.   Body mass index is 20.05 kg/(m^2).   mychart offered.           GERD (gastroesophageal reflux disease)     nexium 20mg orally first thing in the morning before eating.         Anxiety     She describes being very worried about her health because of sick family members.  For example she has noticed that her hands sometimes feel cold so she thinks she could have lupus.  She also says that she is not in favor of taking medications.    For her anxiety today I did recommend that she consider someday taking an SSRI.  She is not ready for that at this times so I suggested counseling and/or meditation.    She will try some daily meditation and return to clinic as needed.           Other Visit Diagnoses     Screening, lipid        Relevant Orders    Lipid Cascade    Screening for diabetes mellitus        Relevant Orders    Glycosylated Hemoglobin A1c    Screening for metabolic disorder        Relevant Orders    Comprehensive Metabolic Panel    Screening for thyroid disorder        Relevant Orders    Thyroid Moffat    Routine general medical examination at a health care facility        Screening for endocrine,  nutritional, metabolic and immunity disorder        Relevant Orders    Vitamin D, Total (25-Hydroxy)    Screening, anemia, deficiency, iron        Relevant Orders    HM1(CBC and Differential)    HM1 (CBC with Diff)                  I have had an Advance Directives discussion with the patient.     Subjective:      Isha Vergara is a 51 y.o. female who presents for an annual exam. The patient is sexually active. The patient participates in regular exercise: yes. The patient reports that there is not domestic violence in her life.    She spent about 20 min telling me a long story about all of her concerns.  She told me that she is getting a mammogram on Monday because she has had some pain in her left breast (through gyn) and that she tried to treat it with lemon water.  She started drinking a lot of lemon water and then started getting a burning in her chest which made her even more concerned.  Then she started taking nutritional supplement called chasteberry for breast tenderness which then made her lightheaded and she started feeling panicky.  She relates to me that she's had lots of family members with medical issues.  She does get really cold hands once in a while and is worried that this means she has lupus.  She does not describe her hands turning white or fingertips turning purple or anything like that.  Requests rheumatoid factor and shelby.   Mom with lupus. Lots of autoimmune issues in family members. And she is admittedly really anxious.     Healthy Habits:   Regular Exercise: Yes  Sunscreen Use: Yes  Healthy Diet: Yes  Dental Visits Regularly: Yes  Seat Belt: Yes  Sexually active: Yes  Self Breast Exam Monthly:Yes  Hemoccults: No  Flex Sig: No  Colonoscopy: No  Lipid Profile: Yes  Glucose Screen: Yes  Prevention of Osteoporosis: Yes  Last Dexa: No  Guns at Home:  No        There is no immunization history on file for this patient.  Immunization status: up to date and documented, gets these through her  "gynecologist.    No exam data present    Gynecologic History  Patient's last menstrual period was 10/18/2016 (exact date).  Contraception: vasectomy  Last Pap: Due in 2017 with her OB/GYN. Results were: normal  Last mammogram: Every year. Results were: normal. Has one scheduled next Monday.       OB History    Para Term  AB SAB TAB Ectopic Multiple Living   2 2 2       2      # Outcome Date GA Lbr Dank/2nd Weight Sex Delivery Anes PTL Lv   2 Term 03           1 Term 10/19/98                  No current outpatient prescriptions on file.     No current facility-administered medications for this visit.      Past Medical History   Diagnosis Date     Acne vulgaris      Dermatitis      No past surgical history on file.  Sulfites  No family history on file.  Social History     Social History     Marital status:      Spouse name: N/A     Number of children: N/A     Years of education: N/A     Occupational History     Not on file.     Social History Main Topics     Smoking status: Never Smoker     Smokeless tobacco: Never Used     Alcohol use No     Drug use: No     Sexual activity: Not on file     Other Topics Concern     Not on file     Social History Narrative       Review of Systems   Review of Systems   Constitutional: Negative.    HENT: Negative.    Eyes: Negative.    Respiratory: Negative.    Cardiovascular: Negative.    Gastrointestinal: Negative.         Burning in chest after drinking lemon water or eating. tums helps.   Endocrine: Negative.    Genitourinary: Negative.    Musculoskeletal: Negative.    Skin: Negative.         Cold hands at times   Neurological: Negative.    Hematological: Negative.    Psychiatric/Behavioral: Negative.         Very very worried about her health. Worried she might get some really bad illness.             Objective:         Vitals:    17 0936   BP: 128/76   Pulse: 88   Resp: 16   Weight: 128 lb (58.1 kg)   Height: 5' 7\" (1.702 m)     Body mass " index is 20.05 kg/(m^2).    Physical   Physical Exam   Constitutional: She appears well-developed and well-nourished.   HENT:   Right Ear: External ear normal.   Left Ear: External ear normal.   Nose: Nose normal.   Mouth/Throat: Oropharynx is clear and moist.   Eyes: Conjunctivae and EOM are normal. Pupils are equal, round, and reactive to light. Right eye exhibits no discharge. Left eye exhibits no discharge.   Neck: No thyromegaly present.   Cardiovascular: Normal rate, regular rhythm and normal heart sounds.    No murmur heard.  Pulmonary/Chest: Effort normal and breath sounds normal.   Declined, gets this with her gynecologist.     Abdominal: Soft. She exhibits no distension and no mass. There is no tenderness. There is no rebound and no guarding.   Genitourinary:   Genitourinary Comments: Declined, gets this with her gynecologist.   Musculoskeletal: Normal range of motion.   Normal spinal curvature. No joint swelling or deformity.   Lymphadenopathy:     She has no cervical adenopathy.   Neurological: She is alert. She has normal reflexes.   Skin: Skin is warm and dry. No rash noted.   Psychiatric: She has a normal mood and affect.

## 2021-06-08 NOTE — PROGRESS NOTES
Problem List Items Addressed This Visit     Hyperlipidemia     ASCVD calculation done.Not in statin benefit group. Recommend lifestyle modifications - heart healthy diet, regular aerobic exercise, maintain normal body weight, avoid tobacco products.

## 2021-06-15 PROBLEM — E78.5 HYPERLIPIDEMIA: Status: ACTIVE | Noted: 2017-02-02

## 2021-06-15 PROBLEM — Z00.00 PREVENTATIVE HEALTH CARE: Status: ACTIVE | Noted: 2017-02-01

## 2021-06-15 PROBLEM — F41.9 ANXIETY: Status: ACTIVE | Noted: 2017-02-01

## 2021-06-16 PROBLEM — R00.2 PALPITATIONS: Status: ACTIVE | Noted: 2018-02-22

## 2021-06-16 PROBLEM — Z91.02: Status: ACTIVE | Noted: 2018-02-22

## 2021-06-16 PROBLEM — R07.89 ANTERIOR CHEST WALL PAIN: Status: ACTIVE | Noted: 2018-02-22

## 2021-06-16 PROBLEM — H93.19 TINNITUS: Status: ACTIVE | Noted: 2018-02-22

## 2021-06-24 NOTE — PROGRESS NOTES
Chief Complaint   Patient presents with     Sore Throat     For about 10 days. She states that she had a strep test done at [facility] done on Monday. Both rapid and culture test came back negative. She states that it feels like someone is grabbing her throat.      Nasal Congestion     HPI: Patient presents today with about 2 weeks of sore throat.  She notes a small amount of pain with swallowing, but she is able to tolerate her secretions.  She also feels some tenderness along her left submandibular lymph nodes.  When she blows her nose she gets a mild amount of mucus.  No known sick exposure.  Non-smoker.  She denies a cough.  There is a little bit of facial pressure and she does feel more tired and fatigued.  She also reports a mild amount of left ear fullness without pain.  The patient was seen in the [facility] clinic earlier this week and a rapid strep culture came back negative.  The patient reports a history of reflux, but denies current symptoms.    ROS:Review of Systems - History obtained from the patient  General ROS: positive for  - fatigue  Ophthalmic ROS: negative  ENT ROS: positive for - hearing change  Allergy and Immunology ROS: negative  Hematological and Lymphatic ROS: positive for - swollen lymph nodes  Cardiovascular ROS: negative  Gastrointestinal ROS: negative    SH: The Patient's  reports that  has never smoked. she has never used smokeless tobacco. She reports that she does not drink alcohol or use drugs.      FH: The Patient's family history includes Arrhythmia in her brother and father; Myasthenia gravis in her maternal aunt; Other in her mother; Polymyositis in her cousin; Stroke in her father.     Meds:    Current Outpatient Medications on File Prior to Visit   Medication Sig Dispense Refill     calcium, as carbonate, ([id number]) 500 mg calcium (1,250 mg) tablet Take 500 mg by mouth.       cetirizine (ZYRTEC) 10 MG tablet Take 10 mg by mouth daily.       cyanocobalamin, vitamin B-12,  "(B-12 DOTS ORAL)        docosahexanoic acid/epa (FISH OIL ORAL) Take 1 g by mouth.       multivitamin with minerals (THERA M PLUS, FERROUS FUMARAT,) 9 mg iron-400 mcg Tab tablet Take 1 tablet by mouth.       tretinoin (RETIN-A) 0.1 % cream APPLY EXTERNALLY TO THE AFFECTED AREA DAILY 20 g 0     EPINEPHrine (EPIPEN/ADRENACLICK/AUVI-Q) 0.3 mg/0.3 mL injection ADMINISTER 1 PEN INJECTOR INTRAMUSCULARLY UTD PRN  0     LIDOCAINE 2 % solution GARGLE AND SPIT 10-15 ML BY MOUTH EVERY 4 HOURS AS NEEDED FOR UP TO 1 DAY.MAX 8 DOSES/DAY.  0     No current facility-administered medications on file prior to visit.        O:  /78   Pulse 100   Temp 98  F (36.7  C) (Oral)   Ht 5' 6.5\" (1.689 m)   Wt 128 lb (58.1 kg)   SpO2 99%   Breastfeeding? No   BMI 20.35 kg/m      Physical Examination:   General appearance - alert, well appearing, and in no distress  Mental status - alert, oriented to person, place, and time  Eyes - pupils equal and reactive, extraocular eye movements intact  Ears - bilateral TM's and external ear canals normal  Nose - normal and patent, no erythema, discharge or polyps  Mouth - mucous membranes moist, pharynx normal without lesions  Neck - supple, no significant adenopathy  Lymphatics -moderate submandibular lymphadenopathy left greater than right.  Chest - clear to auscultation, no wheezes, rales or rhonchi, symmetric air entry  Heart - normal rate and regular rhythm, S1 and S2 normal, no murmurs noted  Skin - normal coloration and turgor, no rashes, no suspicious skin lesions noted      A/P:     Problem List Items Addressed This Visit     None      Visit Diagnoses     Post-nasal drip    -  Primary    Relevant Medications    fluticasone (FLONASE ALLERGY RELIEF) 50 mcg/actuation nasal spray    Pharyngitis, unspecified etiology        Relevant Medications    azithromycin (ZITHROMAX Z-ZAHIRA) 250 MG tablet            1. Post-nasal drip  Probable postnasal drip.  Start Flonase.  Discussed saline nasal " irrigation.    - fluticasone (FLONASE ALLERGY RELIEF) 50 mcg/actuation nasal spray; 2 sprays into each nostril daily.  Dispense: 16 g; Refill: 12    2. Pharyngitis, unspecified etiology  Given the length of her symptoms, will treat with azithromycin.  Since she does have a history of silent reflux, she was encouraged to start daily omeprazole if her symptoms fail to improve as anticipated.      - azithromycin (ZITHROMAX Z-ZAHIRA) 250 MG tablet; Take 2 tablets (500 mg) on  Day 1,  followed by 1 tablet (250 mg) once daily on Days 2 through 5.  Dispense: 6 tablet; Refill: 0        Kvng Johnson, CNP

## 2021-06-24 NOTE — PATIENT INSTRUCTIONS - HE
I've sent the flonase nasal spray to the pharmacy to hopefully help with the nasal mucous and post nasal drip.    I have sent the azithromycin to your pharmacy since this has been bothering you for so long. Take as instructed on the package.    You can try the omeprazole (prilosec) daily again if not getting better to see if it's more silent reflux symptoms.    Thank you for coming in today!    If you receive a survey from [employer] about your experience today, it would be very helpful if you could fill it out to let us know what went well and what we can improve!    General Information:    Today you had your appointment with Kvng Johnson NP    My hours are:    Monday : Out of clinic  Tuesday : 8:00AM - 5:00 PM  Wednesday: 8:00AM - 5:00 PM  Thursday: 8:00AM - 5:00 PM  Friday: 8:00AM - 5:00 PM    I am not in the office Mondays. Therefore non-urgent calls and medical messages received on Monday will be addressed when I am back in the office on Tuesday. Urgent matters will be reviewed and addressed by one of my partners in the office as needed.    If lab work was done today as part of your evaluation you will generally be contacted via [employer], mail, or phone with the results within 1-5 days. If there is an alarming result we will contact you by phone. Lab results come back at varying times, I generally wait until all lab results are available before making comments on the results.     If you need refills please contact your pharmacy. They will send a refill request to me to review. Please allow 3-5 business days for us to process all refill requests.     My Clinical Assistant is Yazmin. Please call us at [phone] or send a medical message with any questions or concerns.

## 2021-06-26 NOTE — PROGRESS NOTES
Progress Notes by Rita Ch MD at 2/22/2018 10:40 AM     Author: Rita Ch MD Service: -- Author Type: Physician    Filed: 2/22/2018 12:12 PM Encounter Date: 2/22/2018 Status: Signed    : Rita Ch MD (Physician)       Assessment:    We spent 60 minutes today in direct patient contact, 100% of the time in consultation concerning medical problems as listed below.      Healthy though clearly anxious female exam presented for 'annual exam'.  as well as specific problems were addressed.     We did address acne and changed her medication, palpitations and ordered appropriate testing, allergies and requested testing was ordered as well as chest wall pain-all above and beyond the scope of a normal annual exam.    After we had already undertaken the exam it became known to me that she has also seen a gynecologist for an annual exam fairly recently.  So I presume the gynecologist is her primary since they are ordering all preventative medicine testing such as mammogram, Pap smear etc. according to the patient.         Plan:      Problem List Items Addressed This Visit     Eczema     Noted on hands today. She has had this since she was little.          Relevant Medications    tretinoin (RETIN-A) 0.1 % cream    Acne Vulgaris     Controlled by retin A, has seen derm in the past. Currently using 0.05% but has used 0.1% in the past and wants to go back to that dose. So that was prescribed today.         Relevant Medications    tretinoin (RETIN-A) 0.1 % cream    Preventative health care     Pap: sees a gynecologist for this.  Mammo: sees a gynecologist for this.  Colonoscopy:  recommended in March 2017 - she says she will get this through her gynecologist.  Std testing desired:  offered   Osteoporosis prevention discussed.  vitamin d levels ordered. Recommend daily calcium and vitamin d intake to keep good bone health. Recommend weight bearing exercise, no tobacco, and limit alcohol  dexa -  recommended after menopause starts (last period was 7 months ago so maybe next year)  Recommend sunscreen, exercise, & healthy diet.  Offered tsh, glucose, hgb, lipid  I have had an Advance Directives discussion with the patient.   Body mass index is 20.05 kg/(m^2).   mychart active         Anxiety     She still has anxiety but wants to try yoga/ meditation and self care. She is not wanting an oral daily medication but was offered. She says it is not bad enough for counseling.          Hyperlipidemia     Will recheck.          Allergic to food additives     Has a referral from primary MD (gynecologist) to see allergy and requested C1 and C4 levels today. Orders placed.          Relevant Orders    Complement, C'4    C1-Esterase Inhibitor    Tinnitus     Audiology recommended.          Relevant Orders    Ambulatory referral to Audiology    Palpitations     She thinks this is from anxiety, but she does not want anxiety meds. Will check tsh and cbc to look for thyroid abnormality or anemia. If normal recommend follow up for further work up and potentially holter monitor.          Relevant Orders    HM1(CBC and Differential)    Thyroid Cascade    HM1 (CBC with Diff)    Anterior chest wall pain     She believes this is related to her posture.  In fact she does have tenderness in the chest wall with palpation over the muscles and ribs.  This does not seem to be a deep pain but rather I can palpate the area of tenderness and it reproduces the pain.  I have recommended physical therapy, but she declined it saying that she exercises on her own and she does not think it would be helpful.  I did try to explain that physical therapy would specifically target the muscles that are involved in posture and ensure that she is doing those exercises correctly however she still did not want to pursue this.  I will defer to her good judgment.         RESOLVED: GERD (gastroesophageal reflux disease)     Diet controlled. Not currently an  issue.         RESOLVED: Leukopenia     Normalized.            Other Visit Diagnoses     Routine general medical examination at a health care facility    -  Primary    Shaky        Relevant Orders    Thyroid [place]    Screening for metabolic disorder        Relevant Orders    Comprehensive Metabolic Panel    Screening, anemia, deficiency, iron        Relevant Orders    HM1(CBC and Differential)    HM1 (CBC with Diff)    Screening, lipid        Relevant Orders    Lipid [place]    Screening for thyroid disorder        Relevant Orders    Thyroid [place]    Screening for endocrine, nutritional, metabolic and immunity disorder        Relevant Orders    Vitamin D, Total (25-Hydroxy)    Screening for diabetes mellitus        Screen for colon cancer        Relevant Orders    Ambulatory referral for Colonoscopy    Need for Tdap vaccination        Relevant Orders    Tdap vaccine,  8yo or older,  IM (Completed)           I have had an Advance Directives discussion with the patient.     Subjective:      Isha Vergara is a 52 y.o. female who presents for an annual exam. The patient is sexually active. The patient participates in regular exercise: yes. The patient reports that there is not domestic violence in her life.     She is really worried because she has had some anterior chest wall pain that specifically worsens when she slouches forward.  She has been off of physical therapy in the past for this and even a mammogram was done.  The mammogram is normal and she does not want to do physical therapy because she says she exercises all the time and she does not think this would help.    She also tells me that she has buzzing in both years and this is been going on for a very very long time even years so she would like to get that checked out.    She also has eczema on her hands and thinks she has some sort of allergies and would like a C4 level and a C1 inhibitor antigenic level checked.  Her gynecologist is already placed in  order to allergy and so she has an appointment set but would like these labs available for that visit.    Lastly she says she notes intermittently at night that she gets palpitations related to her anxiety.  She says her anxiety is fine and she does not want it treated but she would like to get the palpitations checked out further.    Healthy Habits:   Regular Exercise: Yes  Sunscreen Use: Yes  Healthy Diet: Yes  Dental Visits Regularly: Yes  Seat Belt: Yes  Sexually active: Yes  Self Breast Exam Monthly:Yes sometimes  Hemoccults: No  Flex Sig: No  Colonoscopy: Yes wants cologuard  Lipid Profile: Yes  Glucose Screen: Yes  Prevention of Osteoporosis: Yes  Last Dexa: waiting until menopause  Guns at Home:  No      Immunization History   Administered Date(s) Administered   ? Influenza, Live, Nasal LAIV3 10/22/2012, 10/21/2013, 2014, 2015   ? Influenza, Seasonal, Inj PF IIV3 2011, 10/20/2016   ? Influenza, inj, historic,unspecified 10/20/2016   ? Influenza, seasonal,quad inj 6-35 mos 10/21/2016   ? Influenza,seasonal, Inj IIV3 2005, 2006, 10/12/2007, 09/15/2009, 10/10/2010, 2011   ? MMR 2007   ? Td, Adult, Absorbed 2005, 2007, 2008   ? Td,adult,historic,unspecified 2005, 2007   ? Tdap 2018     Immunization status: up to date and documented, missing doses of TDAP.    No exam data present    Gynecologic History  No LMP recorded. Patient is not currently having periods (Reason: Perimenopausal).  Contraception: vasectomy  Last Pap: [facility]  Last mammogram: [facility]    OB History    Para Term  AB Living   2 2 2   2   SAB TAB Ectopic Multiple Live Births             # Outcome Date GA Lbr Dank/2nd Weight Sex Delivery Anes PTL Lv   2 Term 03           1 Term 10/19/98                  Current Outpatient Prescriptions   Medication Sig Dispense Refill   ? cetirizine (ZYRTEC) 10 MG tablet Take 10 mg by mouth daily.     ? tretinoin  (RETIN-A) 0.1 % cream Apply topically daily. 20 g 0     No current facility-administered medications for this visit.      Past Medical History:   Diagnosis Date   ? Acne vulgaris    ? Dermatitis      Past Surgical History:   Procedure Laterality Date   ? APPENDECTOMY     ? breast cyst remove      age 17.  benign cyst   ?  SECTION     ? INCISION AND DRAINAGE / EXCISION THYROGLOSSAL CYST  2004     Sulfites  Family History   Problem Relation Age of Onset   ? Other Mother      mixed connective tissue disease   ? Arrhythmia Father    ? Stroke Father    ? Arrhythmia Brother    ? Myasthenia gravis Maternal Aunt    ? Polymyositis Cousin      Social History     Social History   ? Marital status:      Spouse name: Bruce Damon   ? Number of children: 2   ? 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 Smoking status: Never Smoker   ? Smokeless tobacco: Never Used   ? Alcohol use No      Comment: rare   ? Drug use: No   ? Sexual activity: Yes     Partners: Male     Birth control/ protection: Surgical      Comment:  vasectomy     Other Topics Concern   ? Not on file     Social History Narrative   ? No narrative on file       Review of Systems   Review of Systems   Constitutional: Negative.    HENT: Positive for tinnitus (Both ears, buzzing sensation throughout her head intermittently ongoing for years).    Eyes: Negative.    Respiratory: Negative.    Cardiovascular: Positive for palpitations (When she feels anxious, especially at night in bed she will notice her heart is racing).   Gastrointestinal: Negative.    Endocrine: Negative.    Genitourinary: Negative.    Musculoskeletal: Negative.    Skin: Negative.    Neurological: Negative.    Hematological: Negative.    Psychiatric/Behavioral: Negative.            Objective:         Vitals:    18 1055 18 1116   BP: 152/82 142/70   Pulse: 76    Weight: 133 lb (60.3 kg)   "  Height: 5' 6.5\" (1.689 m)      Body mass index is 21.15 kg/(m^2).    Physical   Physical Exam   Constitutional: She is oriented to person, place, and time. She appears well-developed and well-nourished.   HENT:   Head: Normocephalic and atraumatic.   Right Ear: External ear normal.   Left Ear: External ear normal.   Nose: Nose normal.   Mouth/Throat: Oropharynx is clear and moist.   Eyes: Conjunctivae and EOM are normal. Pupils are equal, round, and reactive to light.   Neck: Normal range of motion. Neck supple.   Cardiovascular: Normal rate, regular rhythm and normal heart sounds.    Pulmonary/Chest: Effort normal and breath sounds normal.   BREAST EXAM DEFERRED TO GYN   Abdominal: Soft. Bowel sounds are normal.   Genitourinary:   Genitourinary Comments: DEFERRED TO GYN   Musculoskeletal: Normal range of motion.        Arms:  She completed tenderness in the anterior chest wall muscles.  It is tender to palpation in the area indicated on the diagram.  This appears to be clearly musculoskeletal and she says it feels related to her posture as it changes when she stands up straight or sits up straight.   Neurological: She is alert and oriented to person, place, and time. She has normal reflexes.   Skin: Skin is warm and dry.   Psychiatric: Her mood appears anxious (Palpably anxious, though she relaxes when I ask about her family, home life etc.). She does not exhibit a depressed mood (states she enjoys life and is happy.).               "

## 2021-07-03 NOTE — ADDENDUM NOTE
Addendum Note by Mahamed Casas at 2/2/2017  2:17 PM     Author: Mahamed Casas Service: -- Author Type:     Filed: 2/2/2017  2:17 PM Encounter Date: 2/1/2017 Status: Signed    : Mahamed Casas ()    Addended by: MAHAMED CASAS on: 2/2/2017 02:17 PM        Modules accepted: Orders

## 2021-09-18 ENCOUNTER — HEALTH MAINTENANCE LETTER (OUTPATIENT)
Age: 56
End: 2021-09-18

## 2021-11-04 ENCOUNTER — E-VISIT (OUTPATIENT)
Dept: URGENT CARE | Facility: URGENT CARE | Age: 56
End: 2021-11-04
Payer: COMMERCIAL

## 2021-11-04 DIAGNOSIS — N39.0 ACUTE UTI (URINARY TRACT INFECTION): Primary | ICD-10-CM

## 2021-11-04 PROCEDURE — 99421 OL DIG E/M SVC 5-10 MIN: CPT | Performed by: PHYSICIAN ASSISTANT

## 2021-11-04 RX ORDER — NITROFURANTOIN 25; 75 MG/1; MG/1
100 CAPSULE ORAL 2 TIMES DAILY
Qty: 10 CAPSULE | Refills: 0 | Status: SHIPPED | OUTPATIENT
Start: 2021-11-04 | End: 2021-11-09

## 2021-11-04 NOTE — PATIENT INSTRUCTIONS
Dear Isha Vergara    After reviewing your responses, I've been able to diagnose you with a urinary tract infection, which is a common infection of the bladder with bacteria.  This is not a sexually transmitted infection, though urinating immediately after intercourse can help prevent infections.  Drinking lots of fluids is also helpful to clear your current infection and prevent the next one.      I have sent a prescription for antibiotics to your pharmacy to treat this infection.    It is important that you take all of your prescribed medication even if your symptoms are improving after a few doses.  Taking all of your medicine helps prevent the symptoms from returning.     If your symptoms worsen, you develop pain in your back or stomach, develop fevers, or are not improving in 5 days, please contact your primary care provider for an appointment or visit any of our convenient Walk-in or Urgent Care Centers to be seen, which can be found on our website here.    Thanks again for choosing us as your health care partner,    Fredy Cordoba PA-C

## 2022-01-08 ENCOUNTER — HEALTH MAINTENANCE LETTER (OUTPATIENT)
Age: 57
End: 2022-01-08

## 2022-11-19 ENCOUNTER — HEALTH MAINTENANCE LETTER (OUTPATIENT)
Age: 57
End: 2022-11-19

## 2023-04-15 ENCOUNTER — HEALTH MAINTENANCE LETTER (OUTPATIENT)
Age: 58
End: 2023-04-15

## 2023-05-18 NOTE — TELEPHONE ENCOUNTER
mssage left for pt to schedule appt to get refill of med.  Please deny and close encounter   urinary symptoms x 3 weeks; states outpt tests neg for uti; took antibiotcs prescribed from urgent care; symtpoms worsening

## 2023-07-25 ENCOUNTER — HOSPITAL ENCOUNTER (OUTPATIENT)
Dept: MAMMOGRAPHY | Facility: CLINIC | Age: 58
Discharge: HOME | End: 2023-07-25
Attending: OBSTETRICS & GYNECOLOGY | Admitting: OBSTETRICS & GYNECOLOGY
Payer: COMMERCIAL

## 2023-07-25 DIAGNOSIS — Z12.31 VISIT FOR SCREENING MAMMOGRAM: ICD-10-CM

## 2023-07-25 PROCEDURE — 77067 SCR MAMMO BI INCL CAD: CPT

## 2023-09-10 ASSESSMENT — ENCOUNTER SYMPTOMS
HEARTBURN: 1
CONSTIPATION: 1
ARTHRALGIAS: 1
BREAST MASS: 1

## 2023-09-10 ASSESSMENT — ANXIETY QUESTIONNAIRES
4. TROUBLE RELAXING: SEVERAL DAYS
GAD7 TOTAL SCORE: 4
1. FEELING NERVOUS, ANXIOUS, OR ON EDGE: SEVERAL DAYS
7. FEELING AFRAID AS IF SOMETHING AWFUL MIGHT HAPPEN: NOT AT ALL
6. BECOMING EASILY ANNOYED OR IRRITABLE: SEVERAL DAYS
3. WORRYING TOO MUCH ABOUT DIFFERENT THINGS: SEVERAL DAYS
IF YOU CHECKED OFF ANY PROBLEMS ON THIS QUESTIONNAIRE, HOW DIFFICULT HAVE THESE PROBLEMS MADE IT FOR YOU TO DO YOUR WORK, TAKE CARE OF THINGS AT HOME, OR GET ALONG WITH OTHER PEOPLE: SOMEWHAT DIFFICULT
GAD7 TOTAL SCORE: 4
5. BEING SO RESTLESS THAT IT IS HARD TO SIT STILL: NOT AT ALL
2. NOT BEING ABLE TO STOP OR CONTROL WORRYING: NOT AT ALL

## 2023-09-11 NOTE — PROGRESS NOTES
Annual Gynecologic Visit Note  2023    Reason for visit: Annual gynecologic exam    HPI: Patient is a 57 yo  who presents today for annual gynecologic exam.  Her last time seeing us was 5 years ago and at that time patient was having considerable anxiety and concerns regarding her health status.  Recommendations were made for referral to mental health and cardiology due to her main concerns that visit, but appears patient never followed up on those referrals.    Today, patient states this is the first time she has been to a physician since the pandemic as she has been anxious about seeking care in medical facilities.  Knows she was due for pap smear so wanted to present and ensure she stays up to date on this.  Patient does note that she has had vaginal dryness and has tried different lubricants without much help from dyspareunia standpoint.  Does use some type of vaginal moisturizer on occasion for daily use.  Patient requesting update on multiple labs today.  Some of this secondary to not having been in to doctor for many year but also has some fatigue and joint pain.  Feels like she needs to get established with a PCP as does not have one to talk about things with like this so would like recommendation to establish care.    Past OB/GYN History:  : 1 CS, 1   Menses: Has not had period for 3 years now, no PMB or concerning vasomotor symptoms  Sexually active with , vasectomy for contraception  Has dyspareunia secondary to dryness  Pap smear History: Last pap 2018 NILM, HPV negative, no history of abnormal pap, due today  Does have history of breast cysts and mastodynia, [facility]  Had recent mammogram and US without concerning findings for cancer in 2023.     Past Medical History:   Diagnosis Date    Acne     Breast disorder     I have a benign breast cyst in left breast.    Eczema       Past Surgical History:   Procedure Laterality Date    APPENDECTOMY       BREAST SURGERY  1982    Fibroadenoma left breast. Age 17.     SECTION       SECTION      EXCISE BREAST CYST/FIBROADENOMA/TUMOR/DUCT LESION/NIPPLE LESION/AREOLAR LESION  age 17    EXCISE CYST THYROGLOSSAL DUCT      INCISION AND DRAINAGE / EXCISION THYROGLOSSAL CYST  2004    OTHER SURGICAL HISTORY      breast cyst removeage 17.  benign cyst      B-12, METHYLCOBALAMIN, SL,   EPINEPHrine (ANY BX GENERIC EQUIV) 0.3 MG/0.3ML injection 2-pack, Inject 0.3 mLs (0.3 mg) into the muscle as needed for anaphylaxis  Misc Natural Products (OSTEO BI-FLEX TRIPLE STRENGTH PO),   multivitamin, therapeutic with minerals (MULTI-VITAMIN) TABS, Take 1 tablet by mouth daily  Omega-3 Fatty Acids (OMEGA-3 FISH OIL PO), Take 1 g by mouth    No current facility-administered medications on file prior to visit.     Allergies   Allergen Reactions    Sulfites Hives    Wheat Bran       Social History     Tobacco Use    Smoking status: Never    Smokeless tobacco: Never   Substance Use Topics    Alcohol use: No     Comment: Alcoholic Drinks/day: rare    Drug use: No      Family History   Problem Relation Age of Onset    Allergies Mother     Thyroid Disease Mother         Hypothyroidism    Hypertension Father     C.A.D. Father     Hyperlipidemia Father     Cerebrovascular Disease Father     Cerebrovascular Disease Maternal Grandmother     Hypertension Brother     Other - See Comments Mother         mixed connective tissue disease    Arrhythmia Father     Arrhythmia Brother     Myasthenia gravis Maternal Aunt     Polymyositis Cousin       Answers submitted by the patient for this visit:  TERESO-7 (Submitted on 9/10/2023)  TERESO 7 TOTAL SCORE: 4  Annual Preventive Visit (Submitted on 9/10/2023)  Chief Complaint: Annual Exam:  Frequency of exercise:: 2-3 days/week  Getting at least 3 servings of Calcium per day:: Yes  Diet:: Vegetarian/vegan, Gluten-free/reduced  Taking medications regularly:: Yes  Medication side effects::  "None  Bi-annual eye exam:: NO  Dental care twice a year:: Yes  Sleep apnea or symptoms of sleep apnea:: None  constipation: Yes  heartburn: Yes  arthralgias: Yes  pelvic pain: No  vaginal bleeding: No  vaginal discharge: No  tenderness: No  breast mass: Yes  breast discharge: No  Additional concerns today:: Yes  Exercise outside of work (Submitted on 9/10/2023)  Chief Complaint: Annual Exam:  Duration of exercise:: 15-30 minutes    O:  BP (!) 154/94   Pulse 105   Ht 1.702 m (5' 7\")   Wt 61.3 kg (135 lb 1.6 oz)   LMP 12/28/2014   BMI 21.16 kg/m     General: NAD, A&Ox3  Neck: No thyromegaly, No thyroid nodules appreciated  Breasts: Symmetrical, No lymphadenopathy, skin changes, nipple discharge or nodules appreciated bilaterally  Abdomen: Soft, NT, ND  Genitourinary:   External Genitalia:  General appearance; normal, Hair distribution; normal, Lesions absent  Urethral Meatus:  Size normal, Location normal, Lesions absent, Atrophy present  Urethra:  Fullness absent, Masses absent  Bladder:  Fullness absent, Masses absent, Tenderness absent  Vagina:  General appearance normal, Discharge absent, Lesions absent, Few rugae present, atrophy present  Cervix:  General appearance normal, Lesions absent, Discharge absent, Tenderness absent, Enlargement absent, Pale and atrophic  Uterus:  Size normal, Contour normal, Position normal, Masses absent, Tenderness absent  Adenexa:  Masses absent, Tenderness absent, Enlargement absent      A/P: 59 yo  presents for annual gynecologic exam  1) Normal breast and pelvic exam  2) Screening for malignant neoplasm of cervix: Pap with cotesting due today  3) Breast cancer screening: UTD, mammogram 7/2023 normal  4) Colon cancer screening: Referral for colposcopy placed today  5) Vaginal dryness/Dyspareunia: Discussed option for consideration of vaginal estrogen.  Patient would like Rx today but will consider if she wants to start this or not.  6) Thyroid, lipid and diabetes " screening ordered today  7) Fatigue/Joint pain: Will check CBC/Plts, CMP, iron studies, Vit D, and other labs requested by patient given high level of anxiety  8) Rx for Retin-A refilled today  9) Recommended patient establish care with PCP to discuss non-OBGYN concerns moving forward  10) RTC In 1 year for annual gynecologic examination    Sandra Zapien MD

## 2023-09-13 ENCOUNTER — LAB (OUTPATIENT)
Dept: LAB | Facility: CLINIC | Age: 58
End: 2023-09-13
Attending: OBSTETRICS & GYNECOLOGY
Payer: COMMERCIAL

## 2023-09-13 ENCOUNTER — OFFICE VISIT (OUTPATIENT)
Dept: OBGYN | Facility: CLINIC | Age: 58
End: 2023-09-13
Attending: OBSTETRICS & GYNECOLOGY
Payer: COMMERCIAL

## 2023-09-13 VITALS
WEIGHT: 135.1 LBS | HEART RATE: 105 BPM | BODY MASS INDEX: 21.2 KG/M2 | SYSTOLIC BLOOD PRESSURE: 154 MMHG | HEIGHT: 67 IN | DIASTOLIC BLOOD PRESSURE: 94 MMHG

## 2023-09-13 DIAGNOSIS — Z13.29 SCREENING FOR THYROID DISORDER: ICD-10-CM

## 2023-09-13 DIAGNOSIS — R53.83 OTHER FATIGUE: ICD-10-CM

## 2023-09-13 DIAGNOSIS — M25.50 MULTIPLE JOINT PAIN: ICD-10-CM

## 2023-09-13 DIAGNOSIS — Z13.220 LIPID SCREENING: ICD-10-CM

## 2023-09-13 DIAGNOSIS — L70.9 ACNE, UNSPECIFIED ACNE TYPE: ICD-10-CM

## 2023-09-13 DIAGNOSIS — Z00.00 ANNUAL PHYSICAL EXAM: ICD-10-CM

## 2023-09-13 DIAGNOSIS — Z00.00 ANNUAL PHYSICAL EXAM: Primary | ICD-10-CM

## 2023-09-13 DIAGNOSIS — Z13.1 SCREENING FOR DIABETES MELLITUS: ICD-10-CM

## 2023-09-13 DIAGNOSIS — N89.8 VAGINAL DRYNESS: ICD-10-CM

## 2023-09-13 DIAGNOSIS — Z12.4 SCREENING FOR MALIGNANT NEOPLASM OF CERVIX: ICD-10-CM

## 2023-09-13 DIAGNOSIS — Z12.11 COLON CANCER SCREENING: ICD-10-CM

## 2023-09-13 LAB
ALBUMIN SERPL BCG-MCNC: 4.5 G/DL (ref 3.5–5.2)
ALBUMIN UR-MCNC: NEGATIVE MG/DL
ALP SERPL-CCNC: 86 U/L (ref 35–104)
ALT SERPL W P-5'-P-CCNC: 17 U/L (ref 0–50)
ANION GAP SERPL CALCULATED.3IONS-SCNC: 16 MMOL/L (ref 7–15)
APPEARANCE UR: CLEAR
AST SERPL W P-5'-P-CCNC: 28 U/L (ref 0–45)
BILIRUB SERPL-MCNC: 0.5 MG/DL
BILIRUB UR QL STRIP: NEGATIVE
BUN SERPL-MCNC: 7.1 MG/DL (ref 6–20)
CALCIUM SERPL-MCNC: 9.9 MG/DL (ref 8.6–10)
CHLORIDE SERPL-SCNC: 100 MMOL/L (ref 98–107)
CHOLEST SERPL-MCNC: 195 MG/DL
COLOR UR AUTO: YELLOW
CREAT SERPL-MCNC: 0.65 MG/DL (ref 0.51–0.95)
CRP SERPL HS-MCNC: 1.02 MG/L
DEPRECATED CALCIDIOL+CALCIFEROL SERPL-MC: 50 UG/L (ref 20–75)
DEPRECATED HCO3 PLAS-SCNC: 21 MMOL/L (ref 22–29)
EGFRCR SERPLBLD CKD-EPI 2021: >90 ML/MIN/1.73M2
ERYTHROCYTE [DISTWIDTH] IN BLOOD BY AUTOMATED COUNT: 13.3 % (ref 10–15)
FOLATE SERPL-MCNC: 33.9 NG/ML (ref 4.6–34.8)
GLUCOSE SERPL-MCNC: 104 MG/DL (ref 70–99)
GLUCOSE UR STRIP-MCNC: NEGATIVE MG/DL
HBA1C MFR BLD: 5.5 %
HCT VFR BLD AUTO: 42.1 % (ref 35–47)
HDLC SERPL-MCNC: 65 MG/DL
HGB BLD-MCNC: 13.4 G/DL (ref 11.7–15.7)
HGB UR QL STRIP: ABNORMAL
IRON BINDING CAPACITY (ROCHE): 387 UG/DL (ref 240–430)
IRON SATN MFR SERPL: 18 % (ref 15–46)
IRON SERPL-MCNC: 68 UG/DL (ref 37–145)
KETONES UR STRIP-MCNC: 10 MG/DL
LDLC SERPL CALC-MCNC: 117 MG/DL
LEUKOCYTE ESTERASE UR QL STRIP: NEGATIVE
MCH RBC QN AUTO: 26.5 PG (ref 26.5–33)
MCHC RBC AUTO-ENTMCNC: 31.8 G/DL (ref 31.5–36.5)
MCV RBC AUTO: 83 FL (ref 78–100)
MUCOUS THREADS #/AREA URNS LPF: PRESENT /LPF
NITRATE UR QL: NEGATIVE
NONHDLC SERPL-MCNC: 130 MG/DL
PH UR STRIP: 5.5 [PH] (ref 5–7)
PLATELET # BLD AUTO: 299 10E3/UL (ref 150–450)
POTASSIUM SERPL-SCNC: 4 MMOL/L (ref 3.4–5.3)
PROT SERPL-MCNC: 8 G/DL (ref 6.4–8.3)
RBC # BLD AUTO: 5.06 10E6/UL (ref 3.8–5.2)
RBC URINE: 11 /HPF
SODIUM SERPL-SCNC: 137 MMOL/L (ref 136–145)
SP GR UR STRIP: 1.02 (ref 1–1.03)
SQUAMOUS EPITHELIAL: 1 /HPF
T4 FREE SERPL-MCNC: 1.61 NG/DL (ref 0.9–1.7)
TRANSITIONAL EPI: <1 /HPF
TRIGL SERPL-MCNC: 67 MG/DL
TSH SERPL DL<=0.005 MIU/L-ACNC: 4.25 UIU/ML (ref 0.3–4.2)
UROBILINOGEN UR STRIP-MCNC: NORMAL MG/DL
VIT B12 SERPL-MCNC: 1491 PG/ML (ref 232–1245)
WBC # BLD AUTO: 4.6 10E3/UL (ref 4–11)
WBC URINE: 6 /HPF

## 2023-09-13 PROCEDURE — 84439 ASSAY OF FREE THYROXINE: CPT

## 2023-09-13 PROCEDURE — G0123 SCREEN CERV/VAG THIN LAYER: HCPCS | Performed by: OBSTETRICS & GYNECOLOGY

## 2023-09-13 PROCEDURE — 84630 ASSAY OF ZINC: CPT

## 2023-09-13 PROCEDURE — 83036 HEMOGLOBIN GLYCOSYLATED A1C: CPT

## 2023-09-13 PROCEDURE — 84443 ASSAY THYROID STIM HORMONE: CPT

## 2023-09-13 PROCEDURE — 83090 ASSAY OF HOMOCYSTEINE: CPT

## 2023-09-13 PROCEDURE — 87624 HPV HI-RISK TYP POOLED RSLT: CPT | Performed by: OBSTETRICS & GYNECOLOGY

## 2023-09-13 PROCEDURE — 84255 ASSAY OF SELENIUM: CPT

## 2023-09-13 PROCEDURE — 83921 ORGANIC ACID SINGLE QUANT: CPT

## 2023-09-13 PROCEDURE — 83789 MASS SPECTROMETRY QUAL/QUAN: CPT

## 2023-09-13 PROCEDURE — 82746 ASSAY OF FOLIC ACID SERUM: CPT

## 2023-09-13 PROCEDURE — 82607 VITAMIN B-12: CPT

## 2023-09-13 PROCEDURE — 83550 IRON BINDING TEST: CPT

## 2023-09-13 PROCEDURE — 81003 URINALYSIS AUTO W/O SCOPE: CPT | Performed by: OBSTETRICS & GYNECOLOGY

## 2023-09-13 PROCEDURE — 99386 PREV VISIT NEW AGE 40-64: CPT | Performed by: OBSTETRICS & GYNECOLOGY

## 2023-09-13 PROCEDURE — 86141 C-REACTIVE PROTEIN HS: CPT

## 2023-09-13 PROCEDURE — 36415 COLL VENOUS BLD VENIPUNCTURE: CPT

## 2023-09-13 PROCEDURE — 80053 COMPREHEN METABOLIC PANEL: CPT

## 2023-09-13 PROCEDURE — 85027 COMPLETE CBC AUTOMATED: CPT

## 2023-09-13 PROCEDURE — 80061 LIPID PANEL: CPT

## 2023-09-13 PROCEDURE — G0463 HOSPITAL OUTPT CLINIC VISIT: HCPCS | Mod: 25 | Performed by: OBSTETRICS & GYNECOLOGY

## 2023-09-13 PROCEDURE — 82306 VITAMIN D 25 HYDROXY: CPT

## 2023-09-13 RX ORDER — ESTRADIOL 0.1 MG/G
2 CREAM VAGINAL
Qty: 42.5 G | Refills: 1 | Status: SHIPPED | OUTPATIENT
Start: 2023-09-14

## 2023-09-13 RX ORDER — TRETINOIN 0.5 MG/G
CREAM TOPICAL AT BEDTIME
Qty: 45 G | Refills: 0 | Status: SHIPPED | OUTPATIENT
Start: 2023-09-13

## 2023-09-13 NOTE — LETTER
2023       RE: Isha Vergara  [address] [place] [address]     Dear Colleague,    Thank you for referring your patient, Isha Vergara, to the [facility] [place] at [facility]. Please see a copy of my visit note below.    Annual Gynecologic Visit Note  2023    Reason for visit: Annual gynecologic exam    HPI: Patient is a 59 yo  who presents today for annual gynecologic exam.  Her last time seeing us was 5 years ago and at that time patient was having considerable anxiety and concerns regarding her health status.  Recommendations were made for referral to mental health and cardiology due to her main concerns that visit, but appears patient never followed up on those referrals.    Today, patient states this is the first time she has been to a physician since the pandemic as she has been anxious about seeking care in medical facilities.  Knows she was due for pap smear so wanted to present and ensure she stays up to date on this.  Patient does note that she has had vaginal dryness and has tried different lubricants without much help from dyspareunia standpoint.  Does use some type of vaginal moisturizer on occasion for daily use.  Patient requesting update on multiple labs today.  Some of this secondary to not having been in to doctor for many year but also has some fatigue and joint pain.  Feels like she needs to get established with a PCP as does not have one to talk about things with like this so would like recommendation to establish care.    Past OB/GYN History:  : 1 CS, 1   Menses: Has not had period for 3 years now, no PMB or concerning vasomotor symptoms  Sexually active with , vasectomy for contraception  Has dyspareunia secondary to dryness  Pap smear History: Last pap 2018 NILM, HPV negative, no history of abnormal pap, due today  Does have history of breast cysts and mastodynia, [facility].  Had recent mammogram and US without concerning findings for cancer in 2023.     Past Medical History:   Diagnosis Date    Acne     Breast disorder     I have a benign breast cyst in left breast.    Eczema       Past Surgical History:   Procedure Laterality Date    APPENDECTOMY      BREAST SURGERY      Fibroadenoma left breast. Age 17.     SECTION       SECTION      EXCISE BREAST CYST/FIBROADENOMA/TUMOR/DUCT LESION/NIPPLE LESION/AREOLAR LESION  age 17    EXCISE CYST THYROGLOSSAL DUCT      INCISION AND DRAINAGE / EXCISION THYROGLOSSAL CYST  2004    OTHER SURGICAL HISTORY      breast cyst removeage 17.  benign cyst      B-12, METHYLCOBALAMIN, SL,   EPINEPHrine (ANY BX GENERIC EQUIV) 0.3 MG/0.3ML injection 2-pack, Inject 0.3 mLs (0.3 mg) into the muscle as needed for anaphylaxis  Misc Natural Products (OSTEO BI-FLEX TRIPLE STRENGTH PO),   multivitamin, therapeutic with minerals (MULTI-VITAMIN) TABS, Take 1 tablet by mouth daily  Omega-3 Fatty Acids (OMEGA-3 FISH OIL PO), Take 1 g by mouth    No current facility-administered medications on file prior to visit.     Allergies   Allergen Reactions    Sulfites Hives    Wheat Bran       Social History     Tobacco Use    Smoking status: Never    Smokeless tobacco: Never   Substance Use Topics    Alcohol use: No     Comment: Alcoholic Drinks/day: rare    Drug use: No      Family History   Problem Relation Age of Onset    Allergies Mother     Thyroid Disease Mother         Hypothyroidism    Hypertension Father     C.A.D. Father     Hyperlipidemia Father     Cerebrovascular Disease Father     Cerebrovascular Disease Maternal Grandmother     Hypertension Brother     Other - See Comments Mother         mixed connective tissue disease    Arrhythmia Father     Arrhythmia Brother     Myasthenia gravis Maternal Aunt     Polymyositis Cousin       Answers submitted by the patient for this visit:  TERESO-7 (Submitted on  "9/10/2023)  TERESO 7 TOTAL SCORE: 4  Annual Preventive Visit (Submitted on 9/10/2023)  Chief Complaint: Annual Exam:  Frequency of exercise:: 2-3 days/week  Getting at least 3 servings of Calcium per day:: Yes  Diet:: Vegetarian/vegan, Gluten-free/reduced  Taking medications regularly:: Yes  Medication side effects:: None  Bi-annual eye exam:: NO  Dental care twice a year:: Yes  Sleep apnea or symptoms of sleep apnea:: None  constipation: Yes  heartburn: Yes  arthralgias: Yes  pelvic pain: No  vaginal bleeding: No  vaginal discharge: No  tenderness: No  breast mass: Yes  breast discharge: No  Additional concerns today:: Yes  Exercise outside of work (Submitted on 9/10/2023)  Chief Complaint: Annual Exam:  Duration of exercise:: 15-30 minutes    O:  BP (!) 154/94   Pulse 105   Ht 1.702 m (5' 7\")   Wt 61.3 kg (135 lb 1.6 oz)   LMP 12/28/2014   BMI 21.16 kg/m     General: NAD, A&Ox3  Neck: No thyromegaly, No thyroid nodules appreciated  Breasts: Symmetrical, No lymphadenopathy, skin changes, nipple discharge or nodules appreciated bilaterally  Abdomen: Soft, NT, ND  Genitourinary:   External Genitalia:  General appearance; normal, Hair distribution; normal, Lesions absent  Urethral Meatus:  Size normal, Location normal, Lesions absent, Atrophy present  Urethra:  Fullness absent, Masses absent  Bladder:  Fullness absent, Masses absent, Tenderness absent  Vagina:  General appearance normal, Discharge absent, Lesions absent, Few rugae present, atrophy present  Cervix:  General appearance normal, Lesions absent, Discharge absent, Tenderness absent, Enlargement absent, Pale and atrophic  Uterus:  Size normal, Contour normal, Position normal, Masses absent, Tenderness absent  Adenexa:  Masses absent, Tenderness absent, Enlargement absent      A/P: 57 yo  presents for annual gynecologic exam  1) Normal breast and pelvic exam  2) Screening for malignant neoplasm of cervix: Pap with cotesting due today  3) Breast cancer " screening: UTD, mammogram 7/2023 normal  4) Colon cancer screening: Referral for colposcopy placed today  5) Vaginal dryness/Dyspareunia: Discussed option for consideration of vaginal estrogen.  Patient would like Rx today but will consider if she wants to start this or not.  6) Thyroid, lipid and diabetes screening ordered today  7) Fatigue/Joint pain: Will check CBC/Plts, CMP, iron studies, Vit D, and other labs requested by patient given high level of anxiety  8) Rx for Retin-A refilled today  9) Recommended patient establish care with PCP to discuss non-OBGYN concerns moving forward  10) RTC In 1 year for annual gynecologic examination    Sandra Zapien MD

## 2023-09-13 NOTE — PATIENT INSTRUCTIONS
Thank you for trusting us with your care!     If you need to contact us for questions about:  Symptoms, Scheduling & Medical Questions; Non-urgent (2-3 day response) Elena message, Urgent (needing response today) [phone] (if after 3:30pm next day response)   Prescriptions: Please call your Pharmacy   Billing: Lalita [phone] or [facility]:[phone]

## 2023-09-15 LAB
BKR LAB AP GYN ADEQUACY: NORMAL
BKR LAB AP GYN INTERPRETATION: NORMAL
BKR LAB AP HPV REFLEX: NORMAL
BKR LAB AP PREVIOUS ABNORMAL: NORMAL
IODINE SERPL-MCNC: 79 NG/ML (ref 40–92)
PATH REPORT.COMMENTS IMP SPEC: NORMAL
PATH REPORT.COMMENTS IMP SPEC: NORMAL
PATH REPORT.RELEVANT HX SPEC: NORMAL
SELENIUM SERPL-MCNC: 138.9 UG/L
ZINC SERPL-MCNC: 71.9 UG/DL

## 2023-09-19 ENCOUNTER — OFFICE VISIT (OUTPATIENT)
Dept: FAMILY MEDICINE | Facility: CLINIC | Age: 58
End: 2023-09-19
Attending: FAMILY MEDICINE
Payer: COMMERCIAL

## 2023-09-19 ENCOUNTER — HOSPITAL ENCOUNTER (OUTPATIENT)
Dept: GENERAL RADIOLOGY | Facility: CLINIC | Age: 58
Discharge: HOME | End: 2023-09-19
Attending: FAMILY MEDICINE
Payer: COMMERCIAL

## 2023-09-19 VITALS — WEIGHT: 132.2 LBS | BODY MASS INDEX: 20.75 KG/M2 | HEIGHT: 67 IN

## 2023-09-19 DIAGNOSIS — Z76.89 ENCOUNTER TO ESTABLISH CARE: Primary | ICD-10-CM

## 2023-09-19 DIAGNOSIS — F41.9 ANXIETY: ICD-10-CM

## 2023-09-19 DIAGNOSIS — M79.644 PAIN OF RIGHT THUMB: ICD-10-CM

## 2023-09-19 DIAGNOSIS — S69.91XA INJURY OF RIGHT THUMB, INITIAL ENCOUNTER: ICD-10-CM

## 2023-09-19 PROBLEM — Z00.00 PREVENTATIVE HEALTH CARE: Status: RESOLVED | Noted: 2017-02-01 | Resolved: 2023-09-19

## 2023-09-19 PROBLEM — R00.2 PALPITATIONS: Status: RESOLVED | Noted: 2018-02-22 | Resolved: 2023-09-19

## 2023-09-19 PROBLEM — R07.89 ANTERIOR CHEST WALL PAIN: Status: RESOLVED | Noted: 2018-02-22 | Resolved: 2023-09-19

## 2023-09-19 LAB
HUMAN PAPILLOMA VIRUS 16 DNA: NEGATIVE
HUMAN PAPILLOMA VIRUS 18 DNA: NEGATIVE
HUMAN PAPILLOMA VIRUS FINAL DIAGNOSIS: NORMAL
HUMAN PAPILLOMA VIRUS OTHER HR: NEGATIVE
METHYLMALONATE SERPL-SCNC: 0.12 UMOL/L (ref 0–0.4)

## 2023-09-19 PROCEDURE — 73140 X-RAY EXAM OF FINGER(S): CPT | Mod: RT

## 2023-09-19 PROCEDURE — G0463 HOSPITAL OUTPT CLINIC VISIT: HCPCS | Performed by: FAMILY MEDICINE

## 2023-09-19 PROCEDURE — 99204 OFFICE O/P NEW MOD 45 MIN: CPT | Performed by: FAMILY MEDICINE

## 2023-09-19 RX ORDER — ALPRAZOLAM 0.25 MG
0.25 TABLET ORAL DAILY PRN
Qty: 10 TABLET | Refills: 0 | Status: SHIPPED | OUTPATIENT
Start: 2023-09-19

## 2023-09-19 RX ORDER — FAMOTIDINE 20 MG
1 TABLET ORAL DAILY
COMMUNITY

## 2023-09-19 NOTE — PROGRESS NOTES
"CC: Establish [facility]      SUBJECTIVE: Isha is a 58 year old female who comes in to [facility]. She had her annual physical with Dr. Zapien on 9/13/2023. Concerns today:     White coat HTN. Checks her BP at home, brings home readings. Declined BP check in clinic today. Has a lot of anxiety around healthcare/hospitals after her mom's illness.   104/72  101/70  105/61  112/76  108/77    Joint pain. Feet hurt, sometimes knees hurt. Started Osteo-Bioflex which did help.   Works out regularly, trying to reduce HIIT exercises. Used to do a lot of HIIT and trampoline jumping. Cheryl and light weights and walking. Lots of gardening. Joints hurt more after physical activity, amee R hip after a lot of jumping. No joint swelling.   She injured her R thumb - a few years ago. Got stuck in a bowling ball. Mis-shapen since then. Occasional wears a brace on the thumb.   Pain in knees has improved with red light therapy  History of shoulder injection the past, years ago, can't remember which shoulder     Anxiety. Has always had anxiety, but has worsened since menopause. Has been prescribed Xanax, but otherwise hasn't been on medications or in therapy. Does take a magnesium powder supplement.     PMH, PSH, FH, allergies, and medications reviewed and updated in Epic.     SH: No tobacco use. Rare alcohol use. No drug use. Relationships: . Son recently started college in [place].       OBJECTIVE:   Ht 1.702 m (5' 7\")   Wt 60 kg (132 lb 3.2 oz)   LMP 12/28/2020 (Approximate)   BMI 20.71 kg/m    General: Alert and oriented in no acute distress.  Cardio: RRR, normal S1 and S2, without murmurs, rubs, or gallops appreciated.    Resp: CTA bilaterally. Normal respiratory effort.   MSK: Distal pulses 2+ and symmetric, lower extremities without deformity, edema. No medial or lateral join line tenderness, full knee flexion & extension without pain, negative valgus & varus stress. R thumb with visible bony protuberance DIP joint. "   Psych: Mood and affect appropriate.      ASSESSMENT/PLAN:   Isha was seen today for establish care.    Encounter to establish care  PMH, PSH, FH, SH, medications, and allergies reviewed and updated in Epic.   Discussed joint pains -- continuing low impact exercise, strength training, and consider PT and/or ortho if sx progress or do not improve. Worrisome signs and symptoms were discussed with Isha and she was instructed to return to the clinic for concerning symptoms or to call with questions.  Continue home BP checks -- contact me if home BP >140/90 or any symptoms.     Injury of right thumb, initial encounter  Evaluate with X-ray.   -     XR Finger Right G/E 2 Views; Future    Anxiety  Recommend therapy referral; declines at this time but will think about it. Also discussed Rx meds but she would prefer to try some vitamins/supplements first. Will Rx alprazolam as needed for plane rides to [place] to visit her son. I discussed with the patient the risks, benefits, and alternatives to taking this medication as well as common side effects.    -     ALPRAZolam (XANAX) 0.25 MG tablet; Take 1 tablet (0.25 mg) by mouth daily as needed for anxiety (prior to plane trip) Take 30-60 minutes prior to flight.      PDMP Review         Value Time User    State PDMP site checked  Yes 9/19/2023 12:01 PM Kasia Tian MD Tina Ozbeki, MD  Family Medicine

## 2023-09-19 NOTE — PATIENT INSTRUCTIONS
Botanicals/supplements that may be helpful for anxiety:   Ashwagandha - mild side effects including stomach upset, nausea; do not use if you are pregnant or trying to get pregnant or have a history of prostate cancer  Chamomile   Lavender extract  Magnesium    Reach out to me if you want to discuss therapy or prescription medication

## 2023-09-20 LAB — HCYS SERPL-SCNC: 7.2 UMOL/L (ref 4–12)

## 2024-10-24 ASSESSMENT — ANXIETY QUESTIONNAIRES: GAD7 TOTAL SCORE: 7

## 2024-11-03 ENCOUNTER — HEALTH MAINTENANCE LETTER (OUTPATIENT)
Age: 59
End: 2024-11-03